# Patient Record
Sex: MALE | Race: WHITE
[De-identification: names, ages, dates, MRNs, and addresses within clinical notes are randomized per-mention and may not be internally consistent; named-entity substitution may affect disease eponyms.]

---

## 2017-05-14 ENCOUNTER — HOSPITAL ENCOUNTER (EMERGENCY)
Dept: HOSPITAL 17 - NEPE | Age: 52
LOS: 1 days | Discharge: HOME | End: 2017-05-15
Payer: SELF-PAY

## 2017-05-14 VITALS
HEART RATE: 99 BPM | DIASTOLIC BLOOD PRESSURE: 74 MMHG | OXYGEN SATURATION: 95 % | TEMPERATURE: 97.7 F | RESPIRATION RATE: 16 BRPM | SYSTOLIC BLOOD PRESSURE: 131 MMHG

## 2017-05-14 VITALS — WEIGHT: 171.96 LBS | HEIGHT: 71 IN | BODY MASS INDEX: 24.07 KG/M2

## 2017-05-14 VITALS
DIASTOLIC BLOOD PRESSURE: 87 MMHG | SYSTOLIC BLOOD PRESSURE: 116 MMHG | OXYGEN SATURATION: 94 % | RESPIRATION RATE: 22 BRPM | HEART RATE: 88 BPM

## 2017-05-14 VITALS
SYSTOLIC BLOOD PRESSURE: 116 MMHG | HEART RATE: 86 BPM | RESPIRATION RATE: 18 BRPM | DIASTOLIC BLOOD PRESSURE: 87 MMHG | OXYGEN SATURATION: 95 %

## 2017-05-14 DIAGNOSIS — F10.129: ICD-10-CM

## 2017-05-14 DIAGNOSIS — R06.02: ICD-10-CM

## 2017-05-14 DIAGNOSIS — Z86.711: ICD-10-CM

## 2017-05-14 DIAGNOSIS — F41.9: ICD-10-CM

## 2017-05-14 DIAGNOSIS — R11.0: ICD-10-CM

## 2017-05-14 DIAGNOSIS — R07.89: Primary | ICD-10-CM

## 2017-05-14 DIAGNOSIS — Z72.0: ICD-10-CM

## 2017-05-14 LAB
ALP SERPL-CCNC: 83 U/L (ref 45–117)
ALT SERPL-CCNC: 22 U/L (ref 12–78)
ANION GAP SERPL CALC-SCNC: 9 MEQ/L (ref 5–15)
APTT BLD: 25 SEC (ref 24.3–30.1)
AST SERPL-CCNC: 36 U/L (ref 15–37)
BASOPHILS # BLD AUTO: 0.1 TH/MM3 (ref 0–0.2)
BASOPHILS NFR BLD: 1.1 % (ref 0–2)
BILIRUB SERPL-MCNC: 0.3 MG/DL (ref 0.2–1)
BUN SERPL-MCNC: 6 MG/DL (ref 7–18)
CHLORIDE SERPL-SCNC: 108 MEQ/L (ref 98–107)
CK SERPL-CCNC: 137 U/L (ref 39–308)
EOSINOPHIL # BLD: 0.2 TH/MM3 (ref 0–0.4)
EOSINOPHIL NFR BLD: 2.8 % (ref 0–4)
ERYTHROCYTE [DISTWIDTH] IN BLOOD BY AUTOMATED COUNT: 14.7 % (ref 11.6–17.2)
GFR SERPLBLD BASED ON 1.73 SQ M-ARVRAT: 72 ML/MIN (ref 89–?)
HCO3 BLD-SCNC: 23.9 MEQ/L (ref 21–32)
HCT VFR BLD CALC: 46.1 % (ref 39–51)
HEMO FLAGS: (no result)
INR PPP: 1 RATIO
LYMPHOCYTES # BLD AUTO: 2.1 TH/MM3 (ref 1–4.8)
LYMPHOCYTES NFR BLD AUTO: 24.4 % (ref 9–44)
MAGNESIUM SERPL-MCNC: 1.8 MG/DL (ref 1.5–2.5)
MCH RBC QN AUTO: 31.7 PG (ref 27–34)
MCHC RBC AUTO-ENTMCNC: 34.6 % (ref 32–36)
MCV RBC AUTO: 91.5 FL (ref 80–100)
MONOCYTES NFR BLD: 7.2 % (ref 0–8)
NEUTROPHILS # BLD AUTO: 5.7 TH/MM3 (ref 1.8–7.7)
NEUTROPHILS NFR BLD AUTO: 64.5 % (ref 16–70)
PLATELET # BLD: 285 TH/MM3 (ref 150–450)
POTASSIUM SERPL-SCNC: 4.2 MEQ/L (ref 3.5–5.1)
PROTHROMBIN TIME: 11.4 SEC (ref 9.8–11.6)
RBC # BLD AUTO: 5.04 MIL/MM3 (ref 4.5–5.9)
SODIUM SERPL-SCNC: 141 MEQ/L (ref 136–145)
WBC # BLD AUTO: 8.8 TH/MM3 (ref 4–11)

## 2017-05-14 PROCEDURE — 85610 PROTHROMBIN TIME: CPT

## 2017-05-14 PROCEDURE — 93005 ELECTROCARDIOGRAM TRACING: CPT

## 2017-05-14 PROCEDURE — 85025 COMPLETE CBC W/AUTO DIFF WBC: CPT

## 2017-05-14 PROCEDURE — 83735 ASSAY OF MAGNESIUM: CPT

## 2017-05-14 PROCEDURE — 71010: CPT

## 2017-05-14 PROCEDURE — 84484 ASSAY OF TROPONIN QUANT: CPT

## 2017-05-14 PROCEDURE — 82550 ASSAY OF CK (CPK): CPT

## 2017-05-14 PROCEDURE — 99285 EMERGENCY DEPT VISIT HI MDM: CPT

## 2017-05-14 PROCEDURE — 85730 THROMBOPLASTIN TIME PARTIAL: CPT

## 2017-05-14 PROCEDURE — 83690 ASSAY OF LIPASE: CPT

## 2017-05-14 PROCEDURE — 71275 CT ANGIOGRAPHY CHEST: CPT

## 2017-05-14 PROCEDURE — 80307 DRUG TEST PRSMV CHEM ANLYZR: CPT

## 2017-05-14 PROCEDURE — 82552 ASSAY OF CPK IN BLOOD: CPT

## 2017-05-14 PROCEDURE — 80053 COMPREHEN METABOLIC PANEL: CPT

## 2017-05-14 PROCEDURE — 96374 THER/PROPH/DIAG INJ IV PUSH: CPT

## 2017-05-14 NOTE — PD
HPI


Chief Complaint:  Chest Pain


Time Seen by Provider:  23:09


Travel History


International Travel<30 days:  No


Contact w/Intl Traveler<30days:  No


Traveled to known affect area:  No





History of Present Illness


HPI


Patient is a 51-year-old male with history of pulmonary embolism no longer 

anticoagulated here with complaint of chest pain.  Patient has had substernal 

pressure, heaviness for the last one hour that radiates to the scapular region.

  Associated shortness of breath, nausea.  Patient denies any emesis or 

diaphoresis.  No history of heart disease.  Patient states that his PE was 

provoked after he fell out of the tree, he works as a .  He has not 

had any pleuritic component to the pain.  No peripheral edema.  Patient smells 

heavily of alcohol and admits to drinking "a couple of beers" tonight.  He is 

quite anxious on exam and admits to feeling this way.





PFSH


Past Medical History


Respiratory:  Yes (pe)


Tetanus Vaccination:  < 5 Years


Influenza Vaccination:  Yes





Past Surgical History


Other Surgery:  Yes (hernia, )





Social History


Alcohol Use:  Yes (occasional )


Tobacco Use:  Yes


Substance Use:  No





Allergies-Medications


(Allergen,Severity, Reaction):  


Coded Allergies:  


     No Known Allergies (Unverified , 5/14/17)


Reported Meds & Prescriptions





Reported Meds & Active Scripts


Active


No Active Prescriptions or Reported Medications    








Review of Systems


ROS Limitations:  Intoxication


Except as stated in HPI:  all other systems reviewed are Neg





Physical Exam


Exam Limitations:  Intoxication


Narrative


GENERAL: Anxious  male in no acute distress, smells heavily of alcohol


SKIN: Focused skin assessment warm/dry.


HEAD: Normocephalic. 


EYES: No scleral icterus. No injection or drainage. 


ENT:   Mucous membranes pink and moist.


NECK: Supple


CARDIOVASCULAR: Regular rate and rhythm.  No murmur appreciated.


RESPIRATORY: No accessory muscle use. Clear to auscultation. Breath sounds 

equal bilaterally. 


GASTROINTESTINAL: Abdomen soft, non-tender, nondistended. 


MUSCULOSKELETAL: No obvious deformities. No edema. 


NEUROLOGICAL: Awake and alert.  Motor grossly within normal limits. Normal 

speech.


PSYCHIATRIC: Anxious





Data


Data


Last Documented VS





Vital Signs








  Date Time  Temp Pulse Resp B/P Pulse Ox O2 Delivery O2 Flow Rate FiO2


 


5/14/17 23:33  80 18  95 Nasal Cannula 2 


 


5/14/17 23:32    116/87    





    125/77    


 


5/14/17 23:06 97.7       








Orders





 Electrocardiogram (5/14/17 23:14)


Ckmb (Isoenzyme) Profile (5/14/17 23:14)


Complete Blood Count With Diff (5/14/17 23:14)


Comprehensive Metabolic Panel (5/14/17 23:14)


Magnesium (Mg) (5/14/17 23:14)


Prothrombin Time / Inr (Pt) (5/14/17 23:14)


Act Partial Throm Time (Ptt) (5/14/17 23:14)


Troponin I (5/14/17 23:14)


Lipase (5/14/17 23:14)


Chest, Single Ap (5/14/17 23:14)


Ecg Monitoring (5/14/17 23:14)


Bilateral Bp Monitoring (5/14/17 23:14)


Iv Access Insert/Monitor (5/14/17 23:14)


Oximetry (5/14/17 23:14)


Aspirin Chew (Aspirin Chew) (5/14/17 23:15)


Sodium Chloride 0.9% Flush (Ns Flush) (5/14/17 23:15)


Ct Pulmonary Angiogram (5/14/17 23:14)


Alcohol (Ethanol) (5/14/17 23:14)


Lorazepam Inj (Ativan Inj) (5/14/17 23:15)


CKMB (5/14/17 23:17)


CKMB% (5/14/17 23:17)


Iohexol 350 Inj (Omnipaque 350 Inj) (5/15/17 00:37)


Electrocardiogram (5/15/17 03:17)


Electrocardiogram (5/15/17 02:17)


Troponin I (5/15/17 02:17)





Labs





 Laboratory Tests








Test 5/14/17 5/15/17





 23:17 02:05


 


White Blood Count 8.8 TH/MM3 


 


Red Blood Count 5.04 MIL/MM3 


 


Hemoglobin 16.0 GM/DL 


 


Hematocrit 46.1 % 


 


Mean Corpuscular Volume 91.5 FL 


 


Mean Corpuscular Hemoglobin 31.7 PG 


 


Mean Corpuscular Hemoglobin 34.6 % 





Concent  


 


Red Cell Distribution Width 14.7 % 


 


Platelet Count 285 TH/MM3 


 


Mean Platelet Volume 8.1 FL 


 


Neutrophils (%) (Auto) 64.5 % 


 


Lymphocytes (%) (Auto) 24.4 % 


 


Monocytes (%) (Auto) 7.2 % 


 


Eosinophils (%) (Auto) 2.8 % 


 


Basophils (%) (Auto) 1.1 % 


 


Neutrophils # (Auto) 5.7 TH/MM3 


 


Lymphocytes # (Auto) 2.1 TH/MM3 


 


Monocytes # (Auto) 0.6 TH/MM3 


 


Eosinophils # (Auto) 0.2 TH/MM3 


 


Basophils # (Auto) 0.1 TH/MM3 


 


CBC Comment DIFF FINAL  


 


Differential Comment   


 


Prothrombin Time 11.4 SEC 


 


Prothromb Time International 1.0 RATIO 





Ratio  


 


Activated Partial 25.0 SEC 





Thromboplast Time  


 


Sodium Level 141 MEQ/L 


 


Potassium Level 4.2 MEQ/L 


 


Chloride Level 108 MEQ/L 


 


Carbon Dioxide Level 23.9 MEQ/L 


 


Anion Gap 9 MEQ/L 


 


Blood Urea Nitrogen 6 MG/DL 


 


Creatinine 1.09 MG/DL 


 


Estimat Glomerular Filtration 72 ML/MIN 





Rate  


 


Random Glucose 105 MG/DL 


 


Calcium Level 8.5 MG/DL 


 


Magnesium Level 1.8 MG/DL 


 


Total Bilirubin 0.3 MG/DL 


 


Aspartate Amino Transf 36 U/L 





(AST/SGOT)  


 


Alanine Aminotransferase 22 U/L 





(ALT/SGPT)  


 


Alkaline Phosphatase 83 U/L 


 


Total Creatine Kinase 137 U/L 


 


Creatine Kinase MB 1.1 NG/ML 


 


Troponin I LESS THAN 0.02 LESS THAN 0.02





 NG/ML NG/ML


 


Total Protein 7.6 GM/DL 


 


Albumin 3.8 GM/DL 


 


Lipase 254 U/L 


 


Ethyl Alcohol Level 131 MG/DL 











MDM


Medical Decision Making


Medical Screen Exam Complete:  Yes


Emergency Medical Condition:  Yes


Medical Record Reviewed:  Yes


Differential Diagnosis


51-year-old male with history of PE no longer anticoagulated here with one hour 

of substernal chest pain radiating to the scapular region, nausea, shortness of 

breath.  Differential includes ACS, PE, atypical chest pain, GERD, pancreatitis

, anxiety, dissection.


Narrative Course


Patient was on monitor, IV established and blood obtained.  A twelve-lead EKG 

shows sinus rhythm without notable ST or T-wave abnormalities and normal 

intervals.  Patient given aspirin, Ativan.  Portable chest x-ray obtained that 

by my read shows no acute abnormalities.  CBC, CMP, lipase, magnesium, CK-MB, 

troponin, coags, blood alcohol level obtained and notable for blood alcohol 

level 131.  CT pulmonary exam negative.  Pain is quite atypical and patient is 

quite anxious.  Given his intoxication which limits history repeat 3 hour 

cardiac enzyme and EKG were obtained.  Troponin remains undetectable and EKG 

remains unchanged, sinus rhythm.  Patient was reassured and discharged home.





Diagnosis





 Primary Impression:  


 Atypical chest pain


 Additional Impressions:  


 Alcohol intoxication


 Qualified Code:  F10.920 - Alcohol intoxication, uncomplicated


 Anxiety


Referrals:  


[facility]


call for appointment





***Additional Instructions:


Follow-up to establish care with primary care physician as discussed.


***Med/Other Pt SpecificInfo:  No Change to Meds


Scripts


No Active Prescriptions or Reported Meds


Disposition:  01 DISCHARGE HOME


Condition:  Stable








Deedee Ellis MD May 14, 2017 23:25

## 2017-05-14 NOTE — RADRPT
EXAM DATE/TIME:  05/14/2017 23:24 

 

[facility] COMPARISON:     

No previous studies available for comparison.

 

                     

INDICATIONS :     

Chest pain.

                     

 

MEDICAL HISTORY :     

None.          

 

SURGICAL HISTORY :     

None.   

 

ENCOUNTER:     

Initial                                        

 

ACUITY:     

1 day      

 

PAIN SCORE:     

4/10

 

LOCATION:     

Bilateral chest 

 

FINDINGS:     

A single view of the chest demonstrates the lungs to be symmetrically aerated without evidence of mas
s, infiltrate or effusion.  The cardiomediastinal contours are unremarkable.  Osseous structures are 
intact. There are multiple overlying electrocardiogram leads.

 

CONCLUSION:     No acute disease.  

 

 

 

 Luke Lara MD on May 14, 2017 at 23:30           

Board Certified Radiologist.

 This report was verified electronically.

## 2017-05-15 LAB — CK MB SERPL-MCNC: 1.1 NG/ML (ref 0.5–3.6)

## 2017-05-15 NOTE — EKG
Date Performed: 05/14/2017       Time Performed: 23:11:40

 

PTAGE:      51 years

 

EKG:      Sinus rhythm 

 

 NORMAL ECG 

 

NO PREVIOUS TRACING            

 

DOCTOR:   Marc See  Interpretating Date/Time  05/15/2017 14:38:54

## 2017-05-15 NOTE — RADRPT
EXAM DATE/TIME:  05/15/2017 00:35 

 

[facility] COMPARISON:     

CHEST SINGLE AP, May 14, 2017, 23:24.

 

 

INDICATIONS :     

Medial chest pain.

                      

 

IV CONTRAST:     

79 cc Omnipaque 350 (iohexol) IV 

 

 

RADIATION DOSE:     

23.17 CTDIvol (mGy) 

 

 

MEDICAL HISTORY :       

Pulmonary embolism

 

SURGICAL HISTORY :       

Hernia repair.

 

ENCOUNTER:      

Initial

 

ACUITY:      

1 day

 

PAIN SCALE:      

6/10

 

LOCATION:        

chest 

 

TECHNIQUE:     

Volumetric scanning of the chest was performed using a pulmonary embolism protocol MIP images were re
constructed.  Using automated exposure control and adjustment of the mA and/or kV according to patien
t size, radiation dose was kept as low as reasonably achievable to obtain optimal diagnostic quality 
images. 

 

FINDINGS:     

 

PULMONARY ARTERIES:

No filling defects are seen in the pulmonary arteries through the segmental level.

 

LUNGS:     

There is no consolidation or pneumothorax .  No concerning pulmonary nodule is visualized.

 

PLEURAE:     

There is no pleural thickening or pleural effusion.

 

MEDIASTINUM:     

There is good visualization of the great vessels of the middle mediastinum.  No evidence of mediastin
al or hilar adenopathy/mass.

 

MUSCULOSKELETAL:     

Within normal limits for patient age.

 

MISCELLANEOUS:     

The visualized upper abdominal organs demonstrate no acute abnormality.

 

CONCLUSION:     

1. No evidence of pulmonary embolism.

2. The lungs are clear.

 

 

 

 Luke Lara MD on May 15, 2017 at 0:51           

Board Certified Radiologist.

 This report was verified electronically.

## 2017-05-15 NOTE — EKG
Date Performed: 05/15/2017       Time Performed: 02:32:19

 

PTAGE:      51 years

 

EKG:      Sinus rhythm 

 

 NORMAL ECG

 

PREVIOUS TRACING       : 05/14/2017 11.11.40 Since previous tracing, no significant change noted

 

DOCTOR:   Marc See  Interpretating Date/Time  05/15/2017 14:39:24

## 2017-08-15 ENCOUNTER — HOSPITAL ENCOUNTER (EMERGENCY)
Dept: HOSPITAL 17 - NEPD | Age: 52
Discharge: HOME | End: 2017-08-15
Payer: SELF-PAY

## 2017-08-15 VITALS — HEIGHT: 71 IN | WEIGHT: 176.37 LBS | BODY MASS INDEX: 24.69 KG/M2

## 2017-08-15 VITALS
HEART RATE: 79 BPM | SYSTOLIC BLOOD PRESSURE: 152 MMHG | RESPIRATION RATE: 18 BRPM | DIASTOLIC BLOOD PRESSURE: 91 MMHG | OXYGEN SATURATION: 96 %

## 2017-08-15 VITALS
SYSTOLIC BLOOD PRESSURE: 161 MMHG | HEART RATE: 95 BPM | RESPIRATION RATE: 20 BRPM | DIASTOLIC BLOOD PRESSURE: 128 MMHG | OXYGEN SATURATION: 97 % | TEMPERATURE: 97.4 F

## 2017-08-15 DIAGNOSIS — F17.290: ICD-10-CM

## 2017-08-15 DIAGNOSIS — F10.230: Primary | ICD-10-CM

## 2017-08-15 LAB
ALP SERPL-CCNC: 108 U/L (ref 45–117)
ALT SERPL-CCNC: 61 U/L (ref 12–78)
ANION GAP SERPL CALC-SCNC: 4 MEQ/L (ref 5–15)
AST SERPL-CCNC: 85 U/L (ref 15–37)
BASOPHILS # BLD AUTO: 0 TH/MM3 (ref 0–0.2)
BASOPHILS NFR BLD: 0.4 % (ref 0–2)
BILIRUB SERPL-MCNC: 0.6 MG/DL (ref 0.2–1)
BUN SERPL-MCNC: 10 MG/DL (ref 7–18)
CHLORIDE SERPL-SCNC: 103 MEQ/L (ref 98–107)
EOSINOPHIL # BLD: 0.1 TH/MM3 (ref 0–0.4)
EOSINOPHIL NFR BLD: 1.2 % (ref 0–4)
ERYTHROCYTE [DISTWIDTH] IN BLOOD BY AUTOMATED COUNT: 14 % (ref 11.6–17.2)
ETHANOL SERPL-MCNC: 287 MG/DL (ref 0–5)
GFR SERPLBLD BASED ON 1.73 SQ M-ARVRAT: 95 ML/MIN (ref 89–?)
HCO3 BLD-SCNC: 29.1 MEQ/L (ref 21–32)
HCT VFR BLD CALC: 44.5 % (ref 39–51)
HEMO FLAGS: (no result)
LYMPHOCYTES # BLD AUTO: 1.4 TH/MM3 (ref 1–4.8)
LYMPHOCYTES NFR BLD AUTO: 23 % (ref 9–44)
MAGNESIUM SERPL-MCNC: 2.6 MG/DL (ref 1.5–2.5)
MCH RBC QN AUTO: 32 PG (ref 27–34)
MCHC RBC AUTO-ENTMCNC: 35 % (ref 32–36)
MCV RBC AUTO: 91.2 FL (ref 80–100)
MONOCYTES NFR BLD: 9.2 % (ref 0–8)
NEUTROPHILS # BLD AUTO: 4 TH/MM3 (ref 1.8–7.7)
NEUTROPHILS NFR BLD AUTO: 66.2 % (ref 16–70)
PLATELET # BLD: 166 TH/MM3 (ref 150–450)
POTASSIUM SERPL-SCNC: 4.4 MEQ/L (ref 3.5–5.1)
RBC # BLD AUTO: 4.88 MIL/MM3 (ref 4.5–5.9)
SODIUM SERPL-SCNC: 136 MEQ/L (ref 136–145)
WBC # BLD AUTO: 6.1 TH/MM3 (ref 4–11)

## 2017-08-15 PROCEDURE — 99284 EMERGENCY DEPT VISIT MOD MDM: CPT

## 2017-08-15 PROCEDURE — 96374 THER/PROPH/DIAG INJ IV PUSH: CPT

## 2017-08-15 PROCEDURE — 85025 COMPLETE CBC W/AUTO DIFF WBC: CPT

## 2017-08-15 PROCEDURE — 96361 HYDRATE IV INFUSION ADD-ON: CPT

## 2017-08-15 PROCEDURE — 80053 COMPREHEN METABOLIC PANEL: CPT

## 2017-08-15 PROCEDURE — 80307 DRUG TEST PRSMV CHEM ANLYZR: CPT

## 2017-08-15 PROCEDURE — 96375 TX/PRO/DX INJ NEW DRUG ADDON: CPT

## 2017-08-15 PROCEDURE — 83735 ASSAY OF MAGNESIUM: CPT

## 2017-08-15 NOTE — PD
Data


Data


Last Documented VS





Vital Signs








  Date Time  Temp Pulse Resp B/P Pulse Ox O2 Delivery O2 Flow Rate FiO2


 


8/15/17 16:42  79 18 152/91 96 Room Air  


 


8/15/17 13:55 97.4       








Orders





 Complete Blood Count With Diff (8/15/17 16:51)


Comprehensive Metabolic Panel (8/15/17 16:51)


Magnesium (Mg) (8/15/17 16:51)


Alcohol (Ethanol) (8/15/17 16:51)


Chlordiazepoxide (Librium) (8/15/17 17:00)


Sodium Chlor 0.9% 1000 Ml Inj (Ns 1000 M (8/15/17 17:00)


Lorazepam Inj (Ativan Inj) (8/15/17 18:30)


Sodium Chlor 0.9% 1000 Ml Inj (Ns 1000 M (8/15/17 18:30)


Thiamine Inj (Thiamine Inj) (8/15/17 18:45)





Labs





 Laboratory Tests








Test 8/15/17





 17:00


 


White Blood Count 6.1 TH/MM3


 


Red Blood Count 4.88 MIL/MM3


 


Hemoglobin 15.6 GM/DL


 


Hematocrit 44.5 %


 


Mean Corpuscular Volume 91.2 FL


 


Mean Corpuscular Hemoglobin 32.0 PG


 


Mean Corpuscular Hemoglobin 35.0 %





Concent 


 


Red Cell Distribution Width 14.0 %


 


Platelet Count 166 TH/MM3


 


Mean Platelet Volume 7.6 FL


 


Neutrophils (%) (Auto) 66.2 %


 


Lymphocytes (%) (Auto) 23.0 %


 


Monocytes (%) (Auto) 9.2 %


 


Eosinophils (%) (Auto) 1.2 %


 


Basophils (%) (Auto) 0.4 %


 


Neutrophils # (Auto) 4.0 TH/MM3


 


Lymphocytes # (Auto) 1.4 TH/MM3


 


Monocytes # (Auto) 0.6 TH/MM3


 


Eosinophils # (Auto) 0.1 TH/MM3


 


Basophils # (Auto) 0.0 TH/MM3


 


CBC Comment DIFF FINAL 


 


Differential Comment  


 


Sodium Level 136 MEQ/L


 


Potassium Level 4.4 MEQ/L


 


Chloride Level 103 MEQ/L


 


Carbon Dioxide Level 29.1 MEQ/L


 


Anion Gap 4 MEQ/L


 


Blood Urea Nitrogen 10 MG/DL


 


Creatinine 0.85 MG/DL


 


Estimat Glomerular Filtration 95 ML/MIN





Rate 


 


Random Glucose 90 MG/DL


 


Calcium Level 8.1 MG/DL


 


Magnesium Level 2.6 MG/DL


 


Total Bilirubin 0.6 MG/DL


 


Aspartate Amino Transf 85 U/L





(AST/SGOT) 


 


Alanine Aminotransferase 61 U/L





(ALT/SGPT) 


 


Alkaline Phosphatase 108 U/L


 


Total Protein 7.8 GM/DL


 


Albumin 4.1 GM/DL


 


Ethyl Alcohol Level 287 MG/DL











[facility]


Medical Record Reviewed:  Yes


Supervised Visit with CALEB:  No


Narrative Course


CBC & BMP Diagram


8/15/17 17:00








Mg 2.6


AST 85


EtOH 287





Vital signs have been normal.  The patient has been resting comfortably in his 

room watching TV.  Patient will go home with Librium.  He was agreeable with 

plan.  Please refer to the outgoing provider's documentation.


Diagnosis





 Primary Impression:  


 Alcohol abuse


 Additional Impression:  


 Alcohol withdrawal


 Qualified Code:  F10.230 - Alcohol withdrawal syndrome without complication


Referrals:  


[facility]


2 days





***Additional Instruction:


You have a choice when it comes to health care, and we are glad that you chose 

[facility]. Hopefully, we have met your expectations on today's visit.  You 

are welcome to return to [facility] at any time, as we are committed to 

meeting the health care needs of our community.


***Med/Other Pt SpecificInfo:  Prescription(s) given


Scripts


Chlordiazepoxide HCl 25 Mg Capsule1 Tab PO AS DIRECTED  #20 


   Prov:Quinton Katz MD         8/15/17


Disposition:  01 DISCHARGE HOME


Condition:  Zelalem Carbajal MD Aug 15, 2017 20:12

## 2017-08-15 NOTE — PD
HPI


Chief Complaint:  Medical Clearance


Time Seen by Provider:  16:34


Travel History


International Travel<30 days:  No


Contact w/Intl Traveler<30days:  No


Traveled to known affect area:  No





History of Present Illness


HPI


The patient is a 52-year-old  male who presents to the emergency 

department for alcohol detoxification.  The patient states he has been drinking 

a case a day for the last 2 weeks, has a history of previous alcohol withdrawal 

as well as to previous attempts at alcohol detoxification.  The patient is 

requesting treatment to stop drinking alcohol.  The patient's last drink 

alcohol was earlier today.  He currently complains of the shakes and feels like 

he is going to "pass out ".  He denies any chest pain, shortness breath, nausea

, vomiting, or abdominal pain.  He does complain of mild tremors and feeling 

ill.  He does not currently have a primary physician.  He denies any illicit 

drug use.





PFSH


Past Medical History


Medical History:  Denies Significant Hx


Cardiovascular Problems:  Yes


Influenza Vaccination:  No





Past Surgical History


Surgical History:  No Previous Surgery





Social History


Alcohol Use:  Yes (over a case of beer a day)


Tobacco Use:  Yes


Substance Use:  No





Allergies-Medications


(Allergen,Severity, Reaction):  


Coded Allergies:  


     No Known Drug Allergies (Verified  Allergy, Unknown, 8/15/17)





Review of Systems


Except as stated in HPI:  all other systems reviewed are Neg


General / Constitutional:  No: Fever


HENT:  Positive: Lightheadedness


Cardiovascular:  No: Chest Pain or Discomfort


Respiratory:  No: Shortness of Breath


Gastrointestinal:  No: Nausea, Vomiting, Abdominal Pain


Musculoskeletal:  Positive: Weakness


Neurologic:  Positive: Other (tremors)


Psychiatric:  Positive: Substance Abuse (alcohol abuse)





Physical Exam


Narrative


GENERAL: Awake, alert, pleasant 52-year-old male who appears his stated age and 

is in no acute respiratory distress.


SKIN: Focused skin assessment warm, slightly diaphoretic.


HEAD: Atraumatic. Normocephalic. 


EYES: Pupils equal and round. No scleral icterus. No injection or drainage. 


ENT: No nasal bleeding or discharge.  Dry mucous membranes.


NECK: Trachea midline. No JVD. 


CARDIOVASCULAR: Regular rate and rhythm.  No murmur appreciated.  Heart rate in 

the 90s.


RESPIRATORY: No accessory muscle use. Clear to auscultation. Breath sounds 

equal bilaterally. 


GASTROINTESTINAL: Abdomen soft, non-tender, nondistended.  No rebound 

tenderness.


MUSCULOSKELETAL: No obvious deformities. No clubbing.  No cyanosis.  No edema.  

Mildly tremulous in the hands.


NEUROLOGICAL: Awake and alert. No obvious cranial nerve deficits.  Motor 

grossly within normal limits. Normal speech.


PSYCHIATRIC: Appropriate mood and affect; insight and judgment normal.





Data


Data


Last Documented VS





Vital Signs








  Date Time  Temp Pulse Resp B/P Pulse Ox O2 Delivery O2 Flow Rate FiO2


 


8/15/17 16:42  79 18 152/91 96 Room Air  


 


8/15/17 13:55 97.4       








Orders





 Complete Blood Count With Diff (8/15/17 16:51)


Comprehensive Metabolic Panel (8/15/17 16:51)


Magnesium (Mg) (8/15/17 16:51)


Alcohol (Ethanol) (8/15/17 16:51)


Chlordiazepoxide (Librium) (8/15/17 17:00)


Sodium Chlor 0.9% 1000 Ml Inj (Ns 1000 M (8/15/17 17:00)








MDM


Medical Decision Making


Medical Screen Exam Complete:  Yes


Emergency Medical Condition:  Yes


Medical Record Reviewed:  Yes


Differential Diagnosis


Differential diagnosis includes alcohol intoxication, alcohol withdrawal, 

dehydration, electrolyte abnormality, alcohol abuse.


Narrative Course


IV was established, labs are drawn and sent, and the patient was placed on 

cardiac telemetry monitoring and continuous pulse oximetry monitoring.  The 

patient was administered Librium 50 mg orally and 1 L of normal saline.  

Potassium and magnesium were sent to lab.  The patient was signed out to the 

oncoming physician, Dr. Lyons, at 5 PM.





Diagnosis





 Primary Impression:  


 Alcohol abuse


 Additional Impression:  


 Alcohol withdrawal


 Qualified Code:  F10.230 - Alcohol withdrawal syndrome without complication


Scripts


Chlordiazepoxide HCl 25 Mg Capsule1 Tab PO AS DIRECTED  #20 


   Prov:Quinton Katz MD         8/15/17


Disposition:  01 DISCHARGE HOME


Condition:  Stable








Quinton Katz MD Aug 15, 2017 17:09

## 2017-08-15 NOTE — PD
Physical Exam


Date Seen by Provider:  Aug 15, 2017


Time Seen by Provider:  13:59


Narrative


52 YOWM C/O DETOX FROM ALCOHOL.





VS REVIEWED


AWAITING BED PLACEMENT





Data


Data


Last Documented VS





Vital Signs








  Date Time  Temp Pulse Resp B/P Pulse Ox O2 Delivery O2 Flow Rate FiO2


 


8/15/17 13:55 97.4 95 20 161/128 97 Room Air  











MDM


Supervised Visit with CALEB:  No


Condition:  Stable








Luis Ricardo Aug 15, 2017 14:02

## 2017-08-29 ENCOUNTER — HOSPITAL ENCOUNTER (OUTPATIENT)
Dept: HOSPITAL 17 - NEDAMB | Age: 52
Setting detail: OBSERVATION
LOS: 6 days | Discharge: HOME | End: 2017-09-04
Attending: HOSPITALIST | Admitting: HOSPITALIST
Payer: SELF-PAY

## 2017-08-29 VITALS
SYSTOLIC BLOOD PRESSURE: 124 MMHG | OXYGEN SATURATION: 94 % | HEART RATE: 102 BPM | RESPIRATION RATE: 18 BRPM | DIASTOLIC BLOOD PRESSURE: 84 MMHG

## 2017-08-29 VITALS
OXYGEN SATURATION: 94 % | RESPIRATION RATE: 16 BRPM | HEART RATE: 97 BPM | DIASTOLIC BLOOD PRESSURE: 95 MMHG | SYSTOLIC BLOOD PRESSURE: 153 MMHG

## 2017-08-29 VITALS
DIASTOLIC BLOOD PRESSURE: 75 MMHG | RESPIRATION RATE: 16 BRPM | SYSTOLIC BLOOD PRESSURE: 125 MMHG | HEART RATE: 102 BPM | OXYGEN SATURATION: 92 %

## 2017-08-29 VITALS
SYSTOLIC BLOOD PRESSURE: 135 MMHG | TEMPERATURE: 98.7 F | DIASTOLIC BLOOD PRESSURE: 90 MMHG | OXYGEN SATURATION: 96 % | RESPIRATION RATE: 16 BRPM | HEART RATE: 106 BPM

## 2017-08-29 VITALS
DIASTOLIC BLOOD PRESSURE: 82 MMHG | TEMPERATURE: 98.4 F | HEART RATE: 104 BPM | SYSTOLIC BLOOD PRESSURE: 135 MMHG | RESPIRATION RATE: 21 BRPM | OXYGEN SATURATION: 92 %

## 2017-08-29 VITALS
HEART RATE: 106 BPM | DIASTOLIC BLOOD PRESSURE: 76 MMHG | OXYGEN SATURATION: 97 % | SYSTOLIC BLOOD PRESSURE: 146 MMHG | RESPIRATION RATE: 18 BRPM

## 2017-08-29 VITALS — WEIGHT: 171.74 LBS | BODY MASS INDEX: 24.04 KG/M2 | HEIGHT: 71 IN

## 2017-08-29 VITALS
RESPIRATION RATE: 16 BRPM | DIASTOLIC BLOOD PRESSURE: 81 MMHG | HEART RATE: 70 BPM | OXYGEN SATURATION: 96 % | SYSTOLIC BLOOD PRESSURE: 139 MMHG

## 2017-08-29 DIAGNOSIS — F10.231: Primary | ICD-10-CM

## 2017-08-29 DIAGNOSIS — Z86.711: ICD-10-CM

## 2017-08-29 DIAGNOSIS — F17.210: ICD-10-CM

## 2017-08-29 DIAGNOSIS — F43.23: ICD-10-CM

## 2017-08-29 DIAGNOSIS — F43.12: ICD-10-CM
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
## 2017-08-29 DIAGNOSIS — J44.9: ICD-10-CM

## 2017-08-29 DIAGNOSIS — E87.6: ICD-10-CM

## 2017-08-29 DIAGNOSIS — R07.89: ICD-10-CM

## 2017-08-29 LAB
ANION GAP SERPL CALC-SCNC: 17 MEQ/L (ref 5–15)
APAP SERPL-MCNC: (no result) MCG/ML (ref 10–30)
BASOPHILS # BLD AUTO: 0 TH/MM3 (ref 0–0.2)
BASOPHILS NFR BLD: 0.2 % (ref 0–2)
BUN SERPL-MCNC: 10 MG/DL (ref 7–18)
CHLORIDE SERPL-SCNC: 94 MEQ/L (ref 98–107)
EOSINOPHIL # BLD: 0 TH/MM3 (ref 0–0.4)
EOSINOPHIL NFR BLD: 0.1 % (ref 0–4)
ERYTHROCYTE [DISTWIDTH] IN BLOOD BY AUTOMATED COUNT: 15 % (ref 11.6–17.2)
ETHANOL SERPL-MCNC: 442 MG/DL (ref 0–5)
GFR SERPLBLD BASED ON 1.73 SQ M-ARVRAT: 74 ML/MIN (ref 89–?)
HCO3 BLD-SCNC: 22.5 MEQ/L (ref 21–32)
HCT VFR BLD CALC: 46.5 % (ref 39–51)
HEMO FLAGS: (no result)
LYMPHOCYTES # BLD AUTO: 0.5 TH/MM3 (ref 1–4.8)
LYMPHOCYTES NFR BLD AUTO: 6.2 % (ref 9–44)
MCH RBC QN AUTO: 31.1 PG (ref 27–34)
MCHC RBC AUTO-ENTMCNC: 33.7 % (ref 32–36)
MCV RBC AUTO: 92.4 FL (ref 80–100)
MONOCYTES NFR BLD: 9.8 % (ref 0–8)
NEUTROPHILS # BLD AUTO: 7.1 TH/MM3 (ref 1.8–7.7)
NEUTROPHILS NFR BLD AUTO: 83.7 % (ref 16–70)
PLATELET # BLD: 228 TH/MM3 (ref 150–450)
POTASSIUM SERPL-SCNC: 2.9 MEQ/L (ref 3.5–5.1)
RBC # BLD AUTO: 5.03 MIL/MM3 (ref 4.5–5.9)
SODIUM SERPL-SCNC: 133 MEQ/L (ref 136–145)
WBC # BLD AUTO: 8.4 TH/MM3 (ref 4–11)

## 2017-08-29 PROCEDURE — 93306 TTE W/DOPPLER COMPLETE: CPT

## 2017-08-29 PROCEDURE — 85025 COMPLETE CBC W/AUTO DIFF WBC: CPT

## 2017-08-29 PROCEDURE — 80076 HEPATIC FUNCTION PANEL: CPT

## 2017-08-29 PROCEDURE — G0378 HOSPITAL OBSERVATION PER HR: HCPCS

## 2017-08-29 PROCEDURE — 97162 PT EVAL MOD COMPLEX 30 MIN: CPT

## 2017-08-29 PROCEDURE — 76937 US GUIDE VASCULAR ACCESS: CPT

## 2017-08-29 PROCEDURE — 97116 GAIT TRAINING THERAPY: CPT

## 2017-08-29 PROCEDURE — 80053 COMPREHEN METABOLIC PANEL: CPT

## 2017-08-29 PROCEDURE — 84484 ASSAY OF TROPONIN QUANT: CPT

## 2017-08-29 PROCEDURE — 96374 THER/PROPH/DIAG INJ IV PUSH: CPT

## 2017-08-29 PROCEDURE — 80061 LIPID PANEL: CPT

## 2017-08-29 PROCEDURE — 71010: CPT

## 2017-08-29 PROCEDURE — 96361 HYDRATE IV INFUSION ADD-ON: CPT

## 2017-08-29 PROCEDURE — 80307 DRUG TEST PRSMV CHEM ANLYZR: CPT

## 2017-08-29 PROCEDURE — 83735 ASSAY OF MAGNESIUM: CPT

## 2017-08-29 PROCEDURE — 96372 THER/PROPH/DIAG INJ SC/IM: CPT

## 2017-08-29 PROCEDURE — 71275 CT ANGIOGRAPHY CHEST: CPT

## 2017-08-29 PROCEDURE — 97110 THERAPEUTIC EXERCISES: CPT

## 2017-08-29 PROCEDURE — 96376 TX/PRO/DX INJ SAME DRUG ADON: CPT

## 2017-08-29 PROCEDURE — 82552 ASSAY OF CPK IN BLOOD: CPT

## 2017-08-29 PROCEDURE — 84100 ASSAY OF PHOSPHORUS: CPT

## 2017-08-29 PROCEDURE — 96375 TX/PRO/DX INJ NEW DRUG ADDON: CPT

## 2017-08-29 PROCEDURE — 80048 BASIC METABOLIC PNL TOTAL CA: CPT

## 2017-08-29 PROCEDURE — 93005 ELECTROCARDIOGRAM TRACING: CPT

## 2017-08-29 PROCEDURE — 82550 ASSAY OF CK (CPK): CPT

## 2017-08-29 PROCEDURE — 84443 ASSAY THYROID STIM HORMONE: CPT

## 2017-08-29 PROCEDURE — 99285 EMERGENCY DEPT VISIT HI MDM: CPT

## 2017-08-29 NOTE — PD
HPI


Chief Complaint:  Alcohol/Drug Intoxication


Time Seen by Provider:  12:50


Travel History


International Travel<30 days:  No


Contact w/Intl Traveler<30days:  No


Traveled to known affect area:  No





History of Present Illness


HPI


52-year-old male presents to the emergency department under a Feliciano act for 

evaluation.  Patient states he has consumed a large amount of alcohol today.  

He is having difficult time dealing with his brother's death. This was not a 

suicide attempt.  Patient has documented history of alcohol abuse.  States that 

he does not typically drink daily.  He has no other symptoms to report.





[facility]


Past Medical History


Medical History:  Denies Significant Hx


Respiratory:  Yes (pe)





Past Surgical History


Other Surgery:  Yes (hernia, )





Social History


Alcohol Use:  Yes (occasional )


Tobacco Use:  Yes


Substance Use:  No





Allergies-Medications


(Allergen,Severity, Reaction):  


Coded Allergies:  


     No Known Allergies (Unverified , 8/29/17)


Reported Meds & Prescriptions





Reported Meds & Active Scripts


Active


No Active Prescriptions or Reported Medications    








Review of Systems


ROS Limitations:  Intoxication


Except as stated in HPI:  all other systems reviewed are Neg





Physical Exam


Exam Limitations:  Intoxication


Narrative


GENERAL: Well-nourished but unkempt male patient, clinically intoxicated with 

strong smell of alcohol and urine, in no acute distress.


SKIN: Focused skin assessment warm/dry.


HEAD: Atraumatic. Normocephalic. 


EYES: Pupils equal and round. No scleral icterus. No injection or drainage. 


ENT: No nasal bleeding or discharge.  Mucous membranes pink and moist.


NECK: Trachea midline. No JVD. 


CARDIOVASCULAR: Regular rate and rhythm.  No murmur appreciated.


RESPIRATORY: No accessory muscle use.  Diminished to auscultation. Breath 

sounds equal bilaterally. 


GASTROINTESTINAL: Abdomen soft, non-tender, nondistended. Hepatic and splenic 

margins not palpable. 


MUSCULOSKELETAL: No obvious deformities. No clubbing.  No cyanosis.  No edema. 


NEUROLOGICAL: Arousable.  I am unable to thoroughly assess cranial nerves.  

Patient moves all extremities.  He has slurred speech.





Data


Data


Last Documented VS





Vital Signs








  Date Time  Temp Pulse Resp B/P (MAP) Pulse Ox O2 Delivery O2 Flow Rate FiO2


 


8/29/17 16:30  106 18 146/76 (99) 97 Nasal Cannula 2.00 


 


8/29/17 13:50 98.4       








Orders





 Orders


Complete Blood Count With Diff (8/29/17 11:09)


Basic Metabolic Panel (Bmp) (8/29/17 11:09)


Alcohol (Ethanol) (8/29/17 11:09)


Salicylates (Aspirin) (8/29/17 11:09)


Tylenol (Acetaminophen) (8/29/17 11:09)


Magnesium (Mg) (8/29/17 12:46)


Potassium Chloride (Kcl) (8/29/17 13:00)


Alcohol Withdrawal Asmt-Ciwa ONCE (8/29/17 14:06)


Flumazenil Inj (Romazicon Inj) (8/29/17 14:15)


Lorazepam (Ativan) (8/29/17 14:15)


Lorazepam Inj (Ativan Inj) (8/29/17 14:15)


Lorazepam (Ativan) (8/29/17 14:15)


Lorazepam Inj (Ativan Inj) (8/29/17 14:15)





Labs





Laboratory Tests








Test


  8/29/17


11:10


 


White Blood Count 8.4 TH/MM3 


 


Red Blood Count 5.03 MIL/MM3 


 


Hemoglobin 15.7 GM/DL 


 


Hematocrit 46.5 % 


 


Mean Corpuscular Volume 92.4 FL 


 


Mean Corpuscular Hemoglobin 31.1 PG 


 


Mean Corpuscular Hemoglobin


Concent 33.7 % 


 


 


Red Cell Distribution Width 15.0 % 


 


Platelet Count 228 TH/MM3 


 


Mean Platelet Volume 6.9 FL 


 


Neutrophils (%) (Auto) 83.7 % 


 


Lymphocytes (%) (Auto) 6.2 % 


 


Monocytes (%) (Auto) 9.8 % 


 


Eosinophils (%) (Auto) 0.1 % 


 


Basophils (%) (Auto) 0.2 % 


 


Neutrophils # (Auto) 7.1 TH/MM3 


 


Lymphocytes # (Auto) 0.5 TH/MM3 


 


Monocytes # (Auto) 0.8 TH/MM3 


 


Eosinophils # (Auto) 0.0 TH/MM3 


 


Basophils # (Auto) 0.0 TH/MM3 


 


CBC Comment DIFF FINAL 


 


Differential Comment  


 


Blood Urea Nitrogen 10 MG/DL 


 


Creatinine 1.06 MG/DL 


 


Random Glucose 150 MG/DL 


 


Calcium Level 8.3 MG/DL 


 


Sodium Level 133 MEQ/L 


 


Potassium Level 2.9 MEQ/L 


 


Chloride Level 94 MEQ/L 


 


Carbon Dioxide Level 22.5 MEQ/L 


 


Anion Gap 17 MEQ/L 


 


Estimat Glomerular Filtration


Rate 74 ML/MIN 


 


 


Magnesium Level 2.1 MG/DL 


 


Salicylates Level


  LESS THAN 1.7


MG/DL


 


Acetaminophen Level


  LESS THAN 2.0


MCG/ML


 


Ethyl Alcohol Level 442 MG/DL 











[facility]


Medical Decision Making


Medical Screen Exam Complete:  Yes


Emergency Medical Condition:  Yes


Medical Record Reviewed:  Yes


Differential Diagnosis


Intoxication versus polysubstance abuse versus mood disorder versus personality 

disorder versus adjustment reaction disorder


Narrative Course


52-year-old male presents to the emergency department under a Feliciano act for 

evaluation.  Patient is clinically intoxicated.  He has slurred speech with 

strong smell of alcohol.  He has also avoided on himself.


CBC is without acute concern.  BMP is with hypokalemia at 2.9.  This is 

repleted here in the emergency department.  EtOH is 442.  Patient will be 

monitored until he is clinically sober and has a safe mode of transportation at 

which time he'll be discharged home.





Diagnosis





 Primary Impression:  


 Alcohol intoxication


 Qualified Codes:  F10.929 - Alcohol use, unspecified with intoxication, 

unspecified


 Additional Impression:  


 Hypokalemia


Referrals:  


Primary Care Physician


Patient Instructions:  Abuse of Alcohol (ED), General Instructions, Hypokalemia 

(ED)





***Additional Instructions:  


Consume alcohol in moderation


Follow-up with a primary care provider


Return immediately with any acute worsening of symptoms


***Med/Other Pt SpecificInfo:  No Change to Meds


Scripts


No Active Prescriptions or Reported Meds


Disposition:  01 DISCHARGE HOME


Condition:  Stable











PhillipsDeana carbajal FREDRICK Aug 29, 2017 12:50

## 2017-08-30 VITALS
SYSTOLIC BLOOD PRESSURE: 133 MMHG | DIASTOLIC BLOOD PRESSURE: 85 MMHG | RESPIRATION RATE: 20 BRPM | HEART RATE: 85 BPM | OXYGEN SATURATION: 95 % | TEMPERATURE: 98.8 F

## 2017-08-30 VITALS — HEART RATE: 73 BPM

## 2017-08-30 VITALS
DIASTOLIC BLOOD PRESSURE: 69 MMHG | OXYGEN SATURATION: 95 % | SYSTOLIC BLOOD PRESSURE: 136 MMHG | RESPIRATION RATE: 16 BRPM | HEART RATE: 80 BPM | TEMPERATURE: 98.2 F

## 2017-08-30 VITALS
OXYGEN SATURATION: 94 % | SYSTOLIC BLOOD PRESSURE: 131 MMHG | DIASTOLIC BLOOD PRESSURE: 78 MMHG | RESPIRATION RATE: 19 BRPM | TEMPERATURE: 98.1 F | HEART RATE: 92 BPM

## 2017-08-30 VITALS
RESPIRATION RATE: 19 BRPM | OXYGEN SATURATION: 96 % | DIASTOLIC BLOOD PRESSURE: 68 MMHG | SYSTOLIC BLOOD PRESSURE: 130 MMHG | TEMPERATURE: 98.6 F | HEART RATE: 86 BPM

## 2017-08-30 VITALS
OXYGEN SATURATION: 96 % | SYSTOLIC BLOOD PRESSURE: 146 MMHG | HEART RATE: 85 BPM | RESPIRATION RATE: 18 BRPM | DIASTOLIC BLOOD PRESSURE: 69 MMHG | TEMPERATURE: 98.3 F

## 2017-08-30 VITALS
DIASTOLIC BLOOD PRESSURE: 88 MMHG | RESPIRATION RATE: 20 BRPM | SYSTOLIC BLOOD PRESSURE: 146 MMHG | OXYGEN SATURATION: 93 % | HEART RATE: 93 BPM

## 2017-08-30 VITALS
OXYGEN SATURATION: 95 % | SYSTOLIC BLOOD PRESSURE: 119 MMHG | RESPIRATION RATE: 16 BRPM | HEART RATE: 94 BPM | DIASTOLIC BLOOD PRESSURE: 89 MMHG

## 2017-08-30 LAB
ALP SERPL-CCNC: 189 U/L (ref 45–117)
ALT SERPL-CCNC: 97 U/L (ref 12–78)
ANION GAP SERPL CALC-SCNC: 7 MEQ/L (ref 5–15)
AST SERPL-CCNC: 136 U/L (ref 15–37)
BILIRUB SERPL-MCNC: 0.9 MG/DL (ref 0.2–1)
BUN SERPL-MCNC: 14 MG/DL (ref 7–18)
CHLORIDE SERPL-SCNC: 98 MEQ/L (ref 98–107)
CK MB SERPL-MCNC: 1.5 NG/ML (ref 0.5–3.6)
CK MB SERPL-MCNC: 1.7 NG/ML (ref 0.5–3.6)
CK SERPL-CCNC: 360 U/L (ref 39–308)
CK SERPL-CCNC: 362 U/L (ref 39–308)
GFR SERPLBLD BASED ON 1.73 SQ M-ARVRAT: 107 ML/MIN (ref 89–?)
HCO3 BLD-SCNC: 30.2 MEQ/L (ref 21–32)
POTASSIUM SERPL-SCNC: 3.6 MEQ/L (ref 3.5–5.1)
SODIUM SERPL-SCNC: 135 MEQ/L (ref 136–145)

## 2017-08-30 RX ADMIN — Medication SCH ML: at 21:00

## 2017-08-30 RX ADMIN — NITROGLYCERIN SCH INCH: 20 OINTMENT TOPICAL at 17:37

## 2017-08-30 RX ADMIN — ASPIRIN 81 MG SCH MG: 81 TABLET ORAL at 16:38

## 2017-08-30 RX ADMIN — FOLIC ACID SCH MG: 1 TABLET ORAL at 08:29

## 2017-08-30 RX ADMIN — MORPHINE SULFATE PRN MG: 2 INJECTION, SOLUTION INTRAMUSCULAR; INTRAVENOUS at 16:40

## 2017-08-30 RX ADMIN — Medication SCH ML: at 08:30

## 2017-08-30 RX ADMIN — Medication SCH MG: at 08:29

## 2017-08-30 NOTE — PD
Physical Exam


Narrative


Received sign out that pt can be discharged when he is clinically sober.  





53yo M with PMH of chronic alcohol abuse here under [employer] for alcohol 

intoxication.  Labs reviewed, no leukocytosis.  Hypokalemia at 2.9 replaced 

orally with 40mEq KCl.  Pt was placed on CIWA by previous provider.  I was 

informed by nurse that pt has been requiring multiple doses of ativan for 

alcohol withdrawal.  Pt last received 1mg of ativan at 1:45am.  He has had a 

total of 5mg of ativan.  Pt evaluated at bedside and is tremulous with tongue 

fasciculations.  States started having visual hallucinations about 2 hours ago.

  The walls are moving.   Blood alcohol was 442 at 11:0am and is now 

withdrawing from alcohol.  Will admit pt for DT.  Discussed with Dr. Hargrove and 

accepted to her service.





Data


Data


Last Documented VS





Vital Signs








  Date Time  Temp Pulse Resp B/P (MAP) Pulse Ox O2 Delivery O2 Flow Rate FiO2


 


8/29/17 23:00  102 18 124/84 (97) 94 Nasal Cannula 2.00 


 


8/29/17 13:50 98.4       








Orders





 Orders


Complete Blood Count With Diff (8/29/17 11:09)


Basic Metabolic Panel (Bmp) (8/29/17 11:09)


Alcohol (Ethanol) (8/29/17 11:09)


Salicylates (Aspirin) (8/29/17 11:09)


Tylenol (Acetaminophen) (8/29/17 11:09)


Magnesium (Mg) (8/29/17 12:46)


Potassium Chloride (Kcl) (8/29/17 13:00)


Alcohol Withdrawal Asmt-Ciwa ONCE (8/29/17 14:06)


Flumazenil Inj (Romazicon Inj) (8/29/17 14:15)


Lorazepam (Ativan) (8/29/17 14:15)


Lorazepam Inj (Ativan Inj) (8/29/17 14:15)


Lorazepam (Ativan) (8/29/17 14:15)


Lorazepam Inj (Ativan Inj) (8/29/17 14:15)


Thiamine Inj (Thiamine Inj) (8/30/17 02:15)


Place In Observation (8/30/17 )


Vital Signs (Adult) Q4H (8/30/17 02:20)


Activity Oob With Assistance (8/30/17 02:20)


Cardiac Monitor / Telemetry .CONTINUOUS (8/30/17 02:20)


Diet Heart Healthy (8/30/17 Breakfast)


Sodium Chloride 0.9% Flush (Ns Flush) (8/30/17 02:30)


Sodium Chloride 0.9% Flush (Ns Flush) (8/30/17 09:00)


Basic Metabolic Panel (Bmp) (8/31/17 06:00)


Complete Blood Count With Diff (8/31/17 06:00)


Naloxone Inj (Narcan Inj) (8/30/17 02:30)


^ Seizure Precautions (8/30/17 02:20)


Potassium Chloride (Kcl) (8/30/17 02:30)


Admit Order (Ed Use Only) (8/30/17 02:22)


Thiamine  (Vit B1) (Vitamin B1) (8/30/17 02:30)


Thiamine  (Vit B1) (Vitamin B1) (8/30/17 09:00)





Labs





Laboratory Tests








Test


  8/29/17


11:10


 


White Blood Count 8.4 TH/MM3 


 


Red Blood Count 5.03 MIL/MM3 


 


Hemoglobin 15.7 GM/DL 


 


Hematocrit 46.5 % 


 


Mean Corpuscular Volume 92.4 FL 


 


Mean Corpuscular Hemoglobin 31.1 PG 


 


Mean Corpuscular Hemoglobin


Concent 33.7 % 


 


 


Red Cell Distribution Width 15.0 % 


 


Platelet Count 228 TH/MM3 


 


Mean Platelet Volume 6.9 FL 


 


Neutrophils (%) (Auto) 83.7 % 


 


Lymphocytes (%) (Auto) 6.2 % 


 


Monocytes (%) (Auto) 9.8 % 


 


Eosinophils (%) (Auto) 0.1 % 


 


Basophils (%) (Auto) 0.2 % 


 


Neutrophils # (Auto) 7.1 TH/MM3 


 


Lymphocytes # (Auto) 0.5 TH/MM3 


 


Monocytes # (Auto) 0.8 TH/MM3 


 


Eosinophils # (Auto) 0.0 TH/MM3 


 


Basophils # (Auto) 0.0 TH/MM3 


 


CBC Comment DIFF FINAL 


 


Differential Comment  


 


Blood Urea Nitrogen 10 MG/DL 


 


Creatinine 1.06 MG/DL 


 


Random Glucose 150 MG/DL 


 


Calcium Level 8.3 MG/DL 


 


Sodium Level 133 MEQ/L 


 


Potassium Level 2.9 MEQ/L 


 


Chloride Level 94 MEQ/L 


 


Carbon Dioxide Level 22.5 MEQ/L 


 


Anion Gap 17 MEQ/L 


 


Estimat Glomerular Filtration


Rate 74 ML/MIN 


 


 


Magnesium Level 2.1 MG/DL 


 


Salicylates Level


  LESS THAN 1.7


MG/DL


 


Acetaminophen Level


  LESS THAN 2.0


MCG/ML


 


Ethyl Alcohol Level 442 MG/DL 











MDM


Supervised Visit with FUAD:  Yes


Diagnosis





 Primary Impression:  


 Alcohol intoxication


 Qualified Codes:  F10.929 - Alcohol use, unspecified with intoxication, 

unspecified


 Additional Impression:  


 Hypokalemia





Admitting Information


Admitting Physician Requests:  Observation


Referrals:  


Primary Care Physician


Patient Instructions:  General Instructions, Hypokalemia (ED), Abuse of Alcohol 

(ED)





***Additional Instruction:  


Consume alcohol in moderation


Follow-up with a primary care provider


Return immediately with any acute worsening of symptoms


Scripts


No Active Prescriptions or Reported Meds











Elisabeth Senior DO Aug 30, 2017 02:04

## 2017-08-30 NOTE — RADRPT
EXAM DATE/TIME:  08/30/2017 19:53 

 

[facility] COMPARISON:     

CT PULMONARY ANGIOGRAM, May 15, 2017, 0:35.

 

 

INDICATIONS :     

Chest pain and shortness of breath.

                      

 

IV CONTRAST:     

70 cc Omnipaque 350 (iohexol) IV 

 

 

RADIATION DOSE:     

23.14 CTDIvol (mGy) 

 

 

MEDICAL HISTORY :     

Chronic obstructive pulmonary disease. Hernia, hiatal. 

 

SURGICAL HISTORY :      

None. 

 

ENCOUNTER:      

Initial

 

ACUITY:      

1 day

 

PAIN SCALE:      

7/10

 

LOCATION:       

Bilateral chest 

 

TECHNIQUE:     

Volumetric scanning of the chest was performed using a pulmonary embolism protocol MIP images were re
constructed.  Using automated exposure control and adjustment of the mA and/or kV according to patien
t size, radiation dose was kept as low as reasonably achievable to obtain optimal diagnostic quality 
images.   DICOM format image data is available electronically for review and comparison.  

 

Follow-up recommendations for detected pulmonary nodules are based at a minimum on nodule size and pa
tient risk factors according to Fleischner Society Guidelines.

 

FINDINGS:     

 

PULMONARY ARTERIES:

No filling defects are seen in the pulmonary arteries through the segmental level.

 

LUNGS:     

There is no consolidation or pneumothorax .  No concerning pulmonary nodule is visualized.

 

PLEURAE:     

There is no pleural thickening or pleural effusion.

 

MEDIASTINUM:     

There is good visualization of the great vessels of the middle mediastinum.  No evidence of mediastin
al or hilar adenopathy/mass.

 

MUSCULOSKELETAL:     

Within normal limits for patient age.

 

MISCELLANEOUS:     

The visualized upper abdominal organs demonstrate no acute abnormality.

 

CONCLUSION:     

No pulmonary embolus.

 

 

 

 

 Izaiah Graves MD on August 30, 2017 at 21:08           

Board Certified Radiologist.

 This report was verified electronically.
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
## 2017-08-30 NOTE — RADRPT
EXAM DATE/TIME:  08/30/2017 03:21 

 

[facility] COMPARISON:     

CHEST SINGLE AP, May 14, 2017, 23:24.

 

                     

INDICATIONS :     

Cough.

                     

 

MEDICAL HISTORY :            

Pulmonary embolism.   

 

SURGICAL HISTORY :     

None.   

 

ENCOUNTER:     

Initial                                        

 

ACUITY:     

1 day      

 

PAIN SCORE:     

0/10

 

LOCATION:     

Bilateral chest 

 

FINDINGS:     

Portable AP view of the chest demonstrates a normal-sized cardiac silhouette. No effusion, consolidat
ion, or pneumothorax is visualized. The bones and soft tissues demonstrate no acute abnormality. EKG 
lines overlie the patient.

 

CONCLUSION:     

No acute cardiopulmonary abnormality is identified.

 

 

 

 Izaiah Quiroz MD on August 30, 2017 at 4:10           

Board Certified Radiologist.

 This report was verified electronically.

## 2017-08-31 VITALS
TEMPERATURE: 98.1 F | DIASTOLIC BLOOD PRESSURE: 85 MMHG | RESPIRATION RATE: 18 BRPM | OXYGEN SATURATION: 95 % | HEART RATE: 76 BPM | SYSTOLIC BLOOD PRESSURE: 130 MMHG

## 2017-08-31 VITALS — HEART RATE: 77 BPM

## 2017-08-31 VITALS
HEART RATE: 79 BPM | OXYGEN SATURATION: 97 % | RESPIRATION RATE: 18 BRPM | DIASTOLIC BLOOD PRESSURE: 86 MMHG | SYSTOLIC BLOOD PRESSURE: 128 MMHG | TEMPERATURE: 98.3 F

## 2017-08-31 VITALS — HEART RATE: 80 BPM

## 2017-08-31 VITALS
SYSTOLIC BLOOD PRESSURE: 139 MMHG | RESPIRATION RATE: 17 BRPM | DIASTOLIC BLOOD PRESSURE: 68 MMHG | TEMPERATURE: 98 F | OXYGEN SATURATION: 96 % | HEART RATE: 75 BPM

## 2017-08-31 VITALS — OXYGEN SATURATION: 97 %

## 2017-08-31 VITALS
OXYGEN SATURATION: 96 % | TEMPERATURE: 98.2 F | DIASTOLIC BLOOD PRESSURE: 79 MMHG | RESPIRATION RATE: 18 BRPM | SYSTOLIC BLOOD PRESSURE: 117 MMHG | HEART RATE: 75 BPM

## 2017-08-31 VITALS
RESPIRATION RATE: 18 BRPM | TEMPERATURE: 98.4 F | HEART RATE: 76 BPM | SYSTOLIC BLOOD PRESSURE: 128 MMHG | DIASTOLIC BLOOD PRESSURE: 82 MMHG | OXYGEN SATURATION: 94 %

## 2017-08-31 VITALS — HEART RATE: 78 BPM

## 2017-08-31 VITALS — OXYGEN SATURATION: 92 %

## 2017-08-31 VITALS — HEART RATE: 71 BPM

## 2017-08-31 VITALS — HEART RATE: 75 BPM

## 2017-08-31 LAB
ALP SERPL-CCNC: 194 U/L (ref 45–117)
ALT SERPL-CCNC: 93 U/L (ref 12–78)
ANION GAP SERPL CALC-SCNC: 9 MEQ/L (ref 5–15)
AST SERPL-CCNC: 110 U/L (ref 15–37)
BASOPHILS # BLD AUTO: 0 TH/MM3 (ref 0–0.2)
BASOPHILS NFR BLD: 0.4 % (ref 0–2)
BILIRUB INDIRECT SERPL-MCNC: 0.6 MG/DL (ref 0–0.8)
BILIRUB SERPL-MCNC: 0.8 MG/DL (ref 0.2–1)
BUN SERPL-MCNC: 13 MG/DL (ref 7–18)
CHLORIDE SERPL-SCNC: 100 MEQ/L (ref 98–107)
EOSINOPHIL # BLD: 0.2 TH/MM3 (ref 0–0.4)
EOSINOPHIL NFR BLD: 3.7 % (ref 0–4)
ERYTHROCYTE [DISTWIDTH] IN BLOOD BY AUTOMATED COUNT: 15.2 % (ref 11.6–17.2)
GFR SERPLBLD BASED ON 1.73 SQ M-ARVRAT: 113 ML/MIN (ref 89–?)
HCO3 BLD-SCNC: 26.9 MEQ/L (ref 21–32)
HCT VFR BLD CALC: 41.1 % (ref 39–51)
HDLC SERPL-MCNC: 94.8 MG/DL (ref 40–60)
HEMO FLAGS: (no result)
LDLC SERPL-MCNC: 97 MG/DL (ref 0–99)
LYMPHOCYTES # BLD AUTO: 0.6 TH/MM3 (ref 1–4.8)
LYMPHOCYTES NFR BLD AUTO: 12.3 % (ref 9–44)
MCH RBC QN AUTO: 32 PG (ref 27–34)
MCHC RBC AUTO-ENTMCNC: 34.6 % (ref 32–36)
MCV RBC AUTO: 92.5 FL (ref 80–100)
MONOCYTES NFR BLD: 9 % (ref 0–8)
NEUTROPHILS # BLD AUTO: 3.6 TH/MM3 (ref 1.8–7.7)
NEUTROPHILS NFR BLD AUTO: 74.6 % (ref 16–70)
PLATELET # BLD: 145 TH/MM3 (ref 150–450)
POTASSIUM SERPL-SCNC: 3.5 MEQ/L (ref 3.5–5.1)
RBC # BLD AUTO: 4.44 MIL/MM3 (ref 4.5–5.9)
SODIUM SERPL-SCNC: 136 MEQ/L (ref 136–145)
WBC # BLD AUTO: 4.8 TH/MM3 (ref 4–11)

## 2017-08-31 RX ADMIN — Medication SCH ML: at 21:00

## 2017-08-31 RX ADMIN — ASPIRIN 81 MG SCH MG: 81 TABLET ORAL at 09:44

## 2017-08-31 RX ADMIN — Medication SCH MG: at 09:44

## 2017-08-31 RX ADMIN — Medication SCH ML: at 09:45

## 2017-08-31 RX ADMIN — NITROGLYCERIN SCH INCH: 20 OINTMENT TOPICAL at 06:00

## 2017-08-31 RX ADMIN — PHENYTOIN SODIUM SCH MLS/HR: 50 INJECTION INTRAMUSCULAR; INTRAVENOUS at 15:16

## 2017-08-31 RX ADMIN — FOLIC ACID SCH MG: 1 TABLET ORAL at 09:44

## 2017-08-31 RX ADMIN — NITROGLYCERIN SCH INCH: 20 OINTMENT TOPICAL at 00:00

## 2017-08-31 NOTE — EKG
Date Performed: 08/30/2017       Time Performed: 23:05:15

 

PTAGE:      52 years

 

EKG:      Sinus rhythm 

 

 NORMAL ECG Compared to prior tracing no significant change 

 

 PREVIOUS TRACING            : 08/30/2017 17.09

 

DOCTOR:   Ludwig Estrella  Interpretating Date/Time  08/31/2017 17:50:56

## 2017-08-31 NOTE — EKG
Date Performed: 08/30/2017       Time Performed: 15:02:37

 

PTAGE:      52 years

 

EKG:      Sinus rhythm 

 

 NORMAL ECG INTERPRETATION BASED ON A DEFAULT AGE OF 40 YEARS Compared to prior tracing no significan
t change 

 

 PREVIOUS TRACING            : 05/15/2017    02.32.19

 

DOCTOR:   Ludwig Estrella  Interpretating Date/Time  08/31/2017 17:49:37
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
## 2017-08-31 NOTE — HHI.PR
Subjective


Remarks


Follow-up for alcohol withdrawal.  The patient continues to complain of feeling 

poorly.  He has pain all over.  He states she's been vomiting today, discussed 

with RN, no vomiting noted.  He has not tried anything for nausea yet.  He 

continues to report tremors, unchanged.  He continues reported hallucinations, 

unchanged.  He continues to have chest pain that hurts whenever he touches his 

chest.  He is asking for more Librium and Ativan.





Objective


Vitals





Vital Signs








  Date Time  Temp Pulse Resp B/P (MAP) Pulse Ox O2 Delivery O2 Flow Rate FiO2


 


8/31/17 11:51 98.3 79 18 128/86 (100) 97   


 


8/31/17 07:40     92   21


 


8/31/17 07:14 98.4 76 18 128/82 (97) 94   


 


8/31/17 04:36 98.0 75 17 139/68 (91) 96   


 


8/31/17 04:05  80      


 


8/31/17 00:08  71      


 


8/30/17 23:57 98.2 80 16 136/69 (91) 95   


 


8/30/17 20:57  73      


 


8/30/17 20:48 98.6 86 19 130/68 (88) 96   


 


8/30/17 15:04 98.8 85 20 133/85 (101) 95   














I/O      


 


 8/30/17 8/30/17 8/30/17 8/31/17 8/31/17 8/31/17





 07:00 15:00 23:00 07:00 15:00 23:00


 


Intake Total  101 ml    


 


Balance  101 ml    


 


      


 


IV Total  101 ml    


 


# Voids 1     








Result Diagram:  


8/31/17 0747                                                                   

             8/31/17 0747





Imaging





Last Impressions








Chest X-Ray 8/30/17 0000 Signed





Impressions: 





 Service Date/Time:  Wednesday, August 30, 2017 03:21 - CONCLUSION:  No acute 





 cardiopulmonary abnormality is identified.     Izaiah Quiroz MD 


 


CT Angiography 8/30/17 0000 Signed





Impressions: 





 Service Date/Time:  Wednesday, August 30, 2017 19:53 - CONCLUSION:  No 

pulmonary 





 embolus.      Izaiah Graves MD 








Objective Remarks


GENERAL: Well-developed well-nourished.  In no acute distress.


SKIN: Warm and dry. No lesions noted.


HEENT: Normocephalic. Pupils equal and round. Mucous membranes pink and moist. 


CARDIOVASCULAR: Regular rate and regular rhythm.  No murmur appreciated.  Chest 

wall TTP.


RESPIRATORY: No accessory muscle use. Clear to auscultation. Breath sounds 

equal bilaterally. 


GASTROINTESTINAL: Abdomen soft, non-tender, nondistended. Bowel sounds x4.


MUSCULOSKELETAL: No obvious deformities. No clubbing or cyanosis. No edema. 


NEUROLOGICAL: Awake and alert. Moves upper and lower extremities spontaneously. 

Normal speech.  Moderately tremulous.


PSYCHIATRIC: Appropriate mood and affect; insight and judgment normal.





A/P


Assessment and Plan


52-year-old male with a past medical history of alcohol abuse presents for 

alcohol withdrawals





Acute alcohol withdrawal: Vital signs improved today.  Patient had been having 

tachycardia, tremors, hallucinations.


   Continue Librium to 50 mg 3 times a day and if no seizure-like activity, 

plan to begin tapering tomorrow


   Ativan by [facility] protocol


   Thiamine, folate


   IVF


   Seizure precautions


   Monitor on telemetry


   Case management consult for discharge plan





Atypical chest pain: Patient reports nurse that he had been having crushing 8/

10 chest discomfort.  Chest pain is reproducible to palpation.  


Reviewed: EKG showed NSR with no acute ST changes.  Troponins 0.13, 0.11, 0.09, 

0.07; possibly secondary to demand from tachycardia.  Lipid profile within 

normal limits with favorable HDL.  Pulmonary angiogram showed no PE.


   Cardiology consulted, discussed with Dr. Wolff, continue aspirin and 

consider stress testing when clinically improved





Hypokalemia: Potassium 2.9.  Given 80 mEq in the ED.  Magnesium within normal 

limits.


   Repeat labs today and replace electrolytes as needed





DVT prophylaxis: Lovenox


Discharge Planning


Continue inpatient treatment for acute alcohol withdrawal and delirium tremens.

  Needs outpatient substance abuse counseling after discharge.











Mandeep Nelson Aug 31, 2017 14:33

## 2017-08-31 NOTE — PD.CARD.PN
Subjective


Subjective Remarks


No events overnight





CTA negative for PE





Pain which is relieved by morphine





Objective


Medications





Current Medications








 Medications


  (Trade)  Dose


 Ordered  Sig/Devon


 Route  Start Time


 Stop Time Status Last Admin


 


  (Romazicon Inj)  0.2 mg  Q1M  PRN


 IV PUSH  8/29/17 14:15


     


 


 


  (Ativan)  1 mg  Q4H  PRN


 PO  8/29/17 14:15


    8/31/17 09:54


 


 


  (Ativan Inj)  1 mg  Q4H  PRN


 IV PUSH  8/29/17 14:15


    8/31/17 11:34


 


 


  (Ativan)  2 mg  Q2H  PRN


 PO  8/29/17 14:15


    8/31/17 01:37


 


 


  (Ativan Inj)  2 mg  Q2H  PRN


 IV PUSH  8/29/17 14:15


    8/31/17 07:43


 


 


  (Ativan Inj)  2 mg  Q1H  PRN


 IV PUSH  8/29/17 14:15


    8/30/17 17:44


 


 


  (Ativan Inj)  2 mg  Q15M  PRN


 IV PUSH  8/29/17 14:15


    8/30/17 09:51


 


 


  (NS Flush)  2 ml  UNSCH  PRN


 IV FLUSH  8/30/17 02:30


     


 


 


  (NS Flush)  2 ml  BID


 IV FLUSH  8/30/17 09:00


    8/31/17 09:45


 


 


  (Narcan Inj)  0.4 mg  UNSCH  PRN


 IV  8/30/17 02:30


     


 


 


  (Vitamin B1)  100 mg  DAILY


 PO  8/30/17 09:00


    8/31/17 09:44


 


 


  (Folate)  1 mg  DAILY


 PO  8/30/17 09:00


    8/31/17 09:44


 


 


  (Librium)  50 mg  TID


 PO  8/30/17 13:00


    8/31/17 09:44


 


 


  (Motrin)  400 mg  Q6H  PRN


 PO  8/30/17 15:15


    8/30/17 15:31


 


 


  (Nitroglycerin


 2% Oint)  1 inch  Q6HR


 TOP  8/30/17 18:00


    8/30/17 17:37


 


 


  (Morphine Inj)  2 mg  Q4H  PRN


 IV PUSH  8/30/17 16:15


    8/30/17 16:40


 


 


  (Aspirin Chew)  81 mg  DAILY


 CHEW  8/30/17 16:15


    8/31/17 09:44


 


 


  (Lovenox Inj)  40 mg  Q24H


 SQ  9/1/17 04:00


     


 








Vital Signs / I&O





Vital Signs








  Date Time  Temp Pulse Resp B/P (MAP) Pulse Ox O2 Delivery O2 Flow Rate FiO2


 


8/31/17 11:51 98.3 79 18 128/86 (100) 97   


 


8/31/17 07:40     92   21


 


8/31/17 07:14 98.4 76 18 128/82 (97) 94   


 


8/31/17 04:36 98.0 75 17 139/68 (91) 96   


 


8/31/17 04:05  80      


 


8/31/17 00:08  71      


 


8/30/17 23:57 98.2 80 16 136/69 (91) 95   


 


8/30/17 20:57  73      


 


8/30/17 20:48 98.6 86 19 130/68 (88) 96   


 


8/30/17 15:04 98.8 85 20 133/85 (101) 95   














I/O      


 


 8/30/17 8/30/17 8/30/17 8/31/17 8/31/17 8/31/17





 07:00 15:00 23:00 07:00 15:00 23:00


 


Intake Total  101 ml    


 


Balance  101 ml    


 


      


 


IV Total  101 ml    


 


# Voids 1     








Physical Exam


GENERAL: Extremely anxious and tremulous


SKIN: Warm and dry.


HEAD: Atraumatic. Normocephalic. 


EYES: Pupils equal and round. No scleral icterus. No injection or drainage. 


ENT: No nasal bleeding or discharge.  Mucous membranes pink and moist.


NECK: Trachea midline. No JVD. 


CARDIOVASCULAR: Regular rate and rhythm.  


RESPIRATORY: No accessory muscle use. Clear to auscultation. Breath sounds 

equal bilaterally. 


GASTROINTESTINAL: Abdomen soft, non-tender, nondistended. Hepatic and splenic 

margins not palpable. 


MUSCULOSKELETAL: Extremities without clubbing, cyanosis, or edema. No obvious 

deformities. 


NEUROLOGICAL: Awake and alert. No obvious cranial nerve deficits.  Motor 

grossly within normal limits. Five out of 5 muscle strength in the arms and 

legs.  Normal speech.


PSYCHIATRIC: Appropriate mood and affect; insight and judgment normal.


Laboratory





Laboratory Tests








Test


  8/30/17


16:56 8/30/17


20:08 8/31/17


07:47


 


Total Creatine Kinase 360 U/L  362 U/L  


 


Creatine Kinase MB 1.7 NG/ML  1.5 NG/ML  


 


Creatine Kinase MB % 0.5 %  0.4 %  


 


Troponin I 0.11 NG/ML  0.09 NG/ML  0.07 NG/ML 


 


White Blood Count   4.8 TH/MM3 


 


Red Blood Count   4.44 MIL/MM3 


 


Hemoglobin   14.2 GM/DL 


 


Hematocrit   41.1 % 


 


Mean Corpuscular Volume   92.5 FL 


 


Mean Corpuscular Hemoglobin   32.0 PG 


 


Mean Corpuscular Hemoglobin


Concent 


  


  34.6 % 


 


 


Red Cell Distribution Width   15.2 % 


 


Platelet Count   145 TH/MM3 


 


Mean Platelet Volume   7.8 FL 


 


Neutrophils (%) (Auto)   74.6 % 


 


Lymphocytes (%) (Auto)   12.3 % 


 


Monocytes (%) (Auto)   9.0 % 


 


Eosinophils (%) (Auto)   3.7 % 


 


Basophils (%) (Auto)   0.4 % 


 


Neutrophils # (Auto)   3.6 TH/MM3 


 


Lymphocytes # (Auto)   0.6 TH/MM3 


 


Monocytes # (Auto)   0.4 TH/MM3 


 


Eosinophils # (Auto)   0.2 TH/MM3 


 


Basophils # (Auto)   0.0 TH/MM3 


 


CBC Comment   DIFF FINAL 


 


Differential Comment    


 


Blood Urea Nitrogen   13 MG/DL 


 


Creatinine   0.73 MG/DL 


 


Random Glucose   91 MG/DL 


 


Total Protein   6.6 GM/DL 


 


Albumin   3.1 GM/DL 


 


Calcium Level   8.8 MG/DL 


 


Alkaline Phosphatase   194 U/L 


 


Aspartate Amino Transf


(AST/SGOT) 


  


  110 U/L 


 


 


Alanine Aminotransferase


(ALT/SGPT) 


  


  93 U/L 


 


 


Total Bilirubin   0.8 MG/DL 


 


Direct Bilirubin   0.2 MG/DL 


 


Sodium Level   136 MEQ/L 


 


Potassium Level   3.5 MEQ/L 


 


Chloride Level   100 MEQ/L 


 


Carbon Dioxide Level   26.9 MEQ/L 


 


Anion Gap   9 MEQ/L 


 


Estimat Glomerular Filtration


Rate 


  


  113 ML/MIN 


 


 


Indirect Bilirubin   0.6 MG/DL 


 


Triglycerides Level   103 MG/DL 


 


Cholesterol Level   212 MG/DL 


 


LDL Cholesterol   97 MG/DL 


 


HDL Cholesterol   94.8 MG/DL 


 


Cholesterol/HDL Ratio   2.23 RATIO 











Assessment and Plan


Problem List:  


(1) Alcohol intoxication


ICD Codes:  F10.929 - Alcohol use, unspecified with intoxication, unspecified


Status:  Acute


(2) Hypokalemia


ICD Codes:  E87.6 - Hypokalemia


Status:  Acute


(3) Atypical chest pain


ICD Codes:  R07.89 - Other chest pain


Status:  Acute


(4) Anxiety


ICD Codes:  F41.9 - Anxiety disorder, unspecified


Status:  Acute


Assessment and Plan


1) Chest pain appears non-coronary in nature


   Reproducible and relieved with morphine


2) ETOH withdrawal per primary team


3) Depending on the hospital course, will consider further ischemic work up vs 

medical management


4) Check 2D echo





Problem Qualifiers





(1) Alcohol intoxication:  


Qualified Codes:  F10.929 - Alcohol use, unspecified with intoxication, 

unspecified








Joseph Hunter DO Aug 31, 2017 12:26

## 2017-08-31 NOTE — EKG
Date Performed: 08/30/2017       Time Performed: 17:09:46

 

PTAGE:      52 years

 

EKG:      Sinus rhythm 

 

 NORMAL ECG INTERPRETATION BASED ON A DEFAULT AGE OF 40 YEARS Compared to prior tracing no significan
t change 

 

 PREVIOUS TRACING            : 08/30/2017    15.02.37

 

DOCTOR:   Ludwig Estrella  Interpretating Date/Time  08/31/2017 17:50:40

## 2017-09-01 VITALS
DIASTOLIC BLOOD PRESSURE: 84 MMHG | OXYGEN SATURATION: 95 % | SYSTOLIC BLOOD PRESSURE: 123 MMHG | HEART RATE: 70 BPM | RESPIRATION RATE: 18 BRPM | TEMPERATURE: 98.2 F

## 2017-09-01 VITALS
OXYGEN SATURATION: 94 % | RESPIRATION RATE: 16 BRPM | SYSTOLIC BLOOD PRESSURE: 120 MMHG | DIASTOLIC BLOOD PRESSURE: 82 MMHG | HEART RATE: 69 BPM | TEMPERATURE: 98.2 F

## 2017-09-01 VITALS — HEART RATE: 77 BPM

## 2017-09-01 VITALS
TEMPERATURE: 98.4 F | OXYGEN SATURATION: 98 % | SYSTOLIC BLOOD PRESSURE: 129 MMHG | RESPIRATION RATE: 18 BRPM | DIASTOLIC BLOOD PRESSURE: 87 MMHG | HEART RATE: 74 BPM

## 2017-09-01 VITALS
RESPIRATION RATE: 16 BRPM | DIASTOLIC BLOOD PRESSURE: 51 MMHG | HEART RATE: 105 BPM | TEMPERATURE: 97.6 F | OXYGEN SATURATION: 91 % | SYSTOLIC BLOOD PRESSURE: 98 MMHG

## 2017-09-01 VITALS
TEMPERATURE: 98.3 F | SYSTOLIC BLOOD PRESSURE: 132 MMHG | OXYGEN SATURATION: 97 % | HEART RATE: 76 BPM | RESPIRATION RATE: 16 BRPM | DIASTOLIC BLOOD PRESSURE: 83 MMHG

## 2017-09-01 VITALS
OXYGEN SATURATION: 97 % | HEART RATE: 74 BPM | TEMPERATURE: 97.3 F | SYSTOLIC BLOOD PRESSURE: 129 MMHG | RESPIRATION RATE: 18 BRPM | DIASTOLIC BLOOD PRESSURE: 85 MMHG

## 2017-09-01 VITALS
DIASTOLIC BLOOD PRESSURE: 82 MMHG | SYSTOLIC BLOOD PRESSURE: 123 MMHG | HEART RATE: 71 BPM | TEMPERATURE: 98.6 F | RESPIRATION RATE: 20 BRPM | OXYGEN SATURATION: 95 %

## 2017-09-01 VITALS — HEART RATE: 67 BPM

## 2017-09-01 VITALS
RESPIRATION RATE: 18 BRPM | DIASTOLIC BLOOD PRESSURE: 80 MMHG | TEMPERATURE: 98.1 F | SYSTOLIC BLOOD PRESSURE: 119 MMHG | OXYGEN SATURATION: 95 % | HEART RATE: 69 BPM

## 2017-09-01 VITALS — HEART RATE: 71 BPM

## 2017-09-01 VITALS — HEART RATE: 97 BPM

## 2017-09-01 RX ADMIN — Medication SCH ML: at 23:36

## 2017-09-01 RX ADMIN — ENOXAPARIN SODIUM SCH MG: 40 INJECTION SUBCUTANEOUS at 03:51

## 2017-09-01 RX ADMIN — MORPHINE SULFATE PRN MG: 2 INJECTION, SOLUTION INTRAMUSCULAR; INTRAVENOUS at 08:32

## 2017-09-01 RX ADMIN — FOLIC ACID SCH MG: 1 TABLET ORAL at 08:31

## 2017-09-01 RX ADMIN — ASPIRIN 81 MG SCH MG: 81 TABLET ORAL at 08:31

## 2017-09-01 RX ADMIN — Medication SCH ML: at 08:31

## 2017-09-01 RX ADMIN — Medication SCH MG: at 08:31

## 2017-09-01 RX ADMIN — PHENYTOIN SODIUM SCH MLS/HR: 50 INJECTION INTRAMUSCULAR; INTRAVENOUS at 03:50

## 2017-09-01 NOTE — ECHRPT
Indication:   chest pain

 

 CONCLUSIONS

 Mildly dilated left ventricle. 

 Wall thickness is normal. 

 The right ventricular systoilc function is mildly decreased. 

 There is mild tricuspid valve regurgitation. 

 The estimated pulmonary arterial pressure is 26 mmHg. 

 Mildly dilated left ventricle. 

 Wall thickness is normal. 

 The left ventricular systolic function is mildly reduced with an estimated ejection fraction in the 
range of 45-

 50%. 

 The right ventricular systoilc function is mildly decreased. 

 There is mild tricuspid valve regurgitation. 

 The estimated pulmonary arterial pressure is 26 mmHg. 

 The pulmonary valve is not well visualized.  

 

 BP:  130   / 85      HR: 105                      Rhythm:           Sinus

 

 MEASUREMENTS  (Male / Female) Normal Values       Technical Quality:Good

 2D ECHO

 LV Diastolic Diameter PLAX        5.2 cm                4.2 - 5.9 / 3.9 - 5.3 cm

 LV Systolic Diameter PLAX         4.4 cm                

 IVS Diastolic Thickness           0.9 cm                0.6 - 1.0 / 0.6 - 0.9 cm

 LVPW Diastolic Thickness          0.9 cm                0.6 - 1.0 / 0.6 - 0.9 cm

 LV Relative Wall Thickness        0.3                   

 RV Internal Dim ED PLAX           1.9 cm                

 LV Ejection Fraction MOD 4C       44.6 %                

 LV Cardiac Index MOD 4C           2159.6 cm/minm     

 LV Ejection Fraction 4C AL        43.7 %                

 LV Cardiac Index 4C AL            2163.1 cm/minm     

 

 M-MODE

 Aortic Root Diameter MM           3.3 cm                

 AV Cusp Separation MM             2.2 cm                

 

 DOPPLER

 Mitral E Point Velocity           58.7 cm/s             

 Mitral A Point Velocity           74.0 cm/s             

 Mitral E to A Ratio               0.8                   

 TR Peak Velocity                  200.0 cm/s            

 TR Peak Gradient                  16.0 mmHg             

 

 

 FINDINGS

 

 LEFT VENTRICLE

 Mildly dilated left ventricle. 

 Wall thickness is normal. 

 

 The left ventricular systolic function is mildly reduced with an estimated ejection fraction in the 
range of 45-

 50%. 

 

 RIGHT VENTRICLE

 The right ventricular systoilc function is mildly decreased. 

 

 LEFT ATRIUM

 The left atrial size is normal.  

 

 RIGHT ATRIUM

 The right atrial size is normal.  

 

 ATRIAL SEPTUM

 Normal atrial septal thickness without atrial level shunting by limited color doppler interrogation.
  

 

 AORTA

 The aortic root and proximal ascending aorta are normal in size on limited imaging.  

 

 MITRAL VALVE

 Structurally normal mitral valve. No mitral valve stenosis or regurgitation.  

 

 AORTIC VALVE

 Trileaflet aortic valve. No aortic valve stenosis or regurgitation.  

 

 TRICUSPID VALVE

 There is mild tricuspid valve regurgitation. 

 The estimated pulmonary arterial pressure is 26 mmHg. 

 

 PULMONARY VALVE

 The pulmonary valve is not well visualized.  

 

 VESSELS

 The inferior vena cava is normal in size.  

 

 PERICARDIUM

 No pericardial effusion.  

 

 

 

 

  Karyn Brooks MD, FACC

  (Electronically Signed)

  Final Date:01 September 2017 15:36

## 2017-09-01 NOTE — HHI.PR
Subjective


Remarks


On my evaluation today, looking from the door, the patient appears very 

comfortable in no acute distress.  When I started talking to him.  He proceeded 

to show me how he is tremulous.  However as the conversation went on it was 

apparent that whenever he is not focusing on "tremors", there is no tremors.  

He reports that he is hallucinating but cannot elaborate on that.  I discussed 

with RN as well who indicated that he is exhibiting benzodiazepine and narcotic 

seeking behaviors.





Objective


Vitals





Vital Signs








  Date Time  Temp Pulse Resp B/P (MAP) Pulse Ox O2 Delivery O2 Flow Rate FiO2


 


9/1/17 09:08 98.2 69 16 120/82 (95) 94   


 


9/1/17 06:12 97.6 105 16 98/51 (67) 91   


 


9/1/17 04:13 98.1 69 18 119/80 (93) 95   


 


9/1/17 04:05  67      


 


9/1/17 01:11 98.2 70 18 123/84 (97) 95   


 


9/1/17 00:04  71      


 


8/31/17 20:20  75      


 


8/31/17 20:09     97   21


 


8/31/17 19:39 98.2 75 18 117/79 (92) 96   


 


8/31/17 17:39  77      


 


8/31/17 15:45 98.1 76 18 130/85 (100) 95   


 


8/31/17 12:11  78      


 


8/31/17 11:51 98.3 79 18 128/86 (100) 97   














I/O      


 


 8/31/17 8/31/17 8/31/17 9/1/17 9/1/17 9/1/17





 06:59 14:59 22:59 06:59 14:59 22:59


 


Output Total   275 ml   


 


Balance   -275 ml   


 


      


 


Output Urine Total   275 ml   


 


# Voids    1  








Result Diagram:  


8/31/17 0747                                                                   

             8/31/17 0747





Imaging





Last Impressions








Chest X-Ray 8/30/17 0000 Signed





Impressions: 





 Service Date/Time:  Wednesday, August 30, 2017 03:21 - CONCLUSION:  No acute 





 cardiopulmonary abnormality is identified.     Izaiah Quiroz MD 


 


CT Angiography 8/30/17 0000 Signed





Impressions: 





 Service Date/Time:  Wednesday, August 30, 2017 19:53 - CONCLUSION:  No 

pulmonary 





 embolus.      Izaiah Graves MD 








Objective Remarks


GENERAL: This is a well-nourished, well-developed patient, in no apparent 

distress.


CARDIOVASCULAR: Normal rate and regular rhythm without murmurs, gallops, or 

rubs. 


RESPIRATORY: Good respiratory efforts. Breath sounds equal and clear to 

auscultation bilaterally.


GASTROINTESTINAL: Abdomen soft, non-tender, non-distended. Normal active bowel 

sounds


MUSCULOSKELETAL: Extremities without cyanosis, or edema.


NEURO:  Alert & Oriented x4 to person, place, time, situation.  Moves all ext 

x4.  At the very least I believe the patient I purposefully shake his hands to 

give the impression of tremors or he is exaggerating any underlying tremors.


PSYCH: Appropriate mood and affect.





A/P


Assessment and Plan


52-year-old male with a past medical history of alcohol abuse presents for 

alcohol withdrawals.  Although initially the patient probably had alcohol 

withdrawal symptoms.  I believe he is now exaggerating or producing symptoms of 

DTs in order to get more benzodiazepines and narcotics.





Alcohol withdrawal: No objective findings to indicate delirium tremens at this 

point.  I am not convinced he is hallucinating.  The reported tremors appear to 

be produced in order to get more benzodiazepines.


   - Discontinue Ativan.


            - Start weaning off Librium. Decrease to 25 mg TID PRN. 


   - Thiamine, folate


   Seizure precautions


   Monitor on telemetry


   Case management consult for discharge plan


            - Patient counseled on the detrimental effect of alcohol on his 

health.  He is advised to seek alcohol rehabilitation on discharge.





Physical deconditioning: Related to chronic alcohol abuse as above.  Probable 

peripheral neuropathy related to that.


- Continue physical therapy.





Atypical chest pain:  


Reviewed: EKG showed NSR with no acute ST changes.  Troponin 0.13, 0.11, 0.09, 

0.07; possibly secondary to demand from tachycardia.  Lipid profile within 

normal limits with favorable HDL.  Pulmonary angiogram showed no PE.


   Cardiology following, 2d echo ordered. Continue Aspirin.  Pain is 

reproducible on exam.  Doubt coronary syndrome. DC Morphine. Keep Ibuprofen 

PRN. 





DVT prophylaxis: Lovenox


Discharge Planning


Continue rehabilitation efforts for physical deconditioning.  Wean off 

benzodiazepines.  The patient should be cleared for discharge once PT 

determines he can ambulate safely at home.  Ideally he should go to [facility] for further alcohol dependence treatment. He is open to that.











Griselda Smith MD Sep 1, 2017 11:32

## 2017-09-01 NOTE — PD.CARD.PN
Subjective


Subjective Remarks


No events overnight





Still feels he's withdrawing





Objective


Medications





Current Medications








 Medications


  (Trade)  Dose


 Ordered  Sig/Devon


 Route  Start Time


 Stop Time Status Last Admin


 


  (NS Flush)  2 ml  UNSCH  PRN


 IV FLUSH  8/30/17 02:30


     


 


 


  (NS Flush)  2 ml  BID


 IV FLUSH  8/30/17 09:00


    9/1/17 08:31


 


 


  (Narcan Inj)  0.4 mg  UNSCH  PRN


 IV  8/30/17 02:30


     


 


 


  (Vitamin B1)  100 mg  DAILY


 PO  8/30/17 09:00


    9/1/17 08:31


 


 


  (Folate)  1 mg  DAILY


 PO  8/30/17 09:00


    9/1/17 08:31


 


 


  (Aspirin Chew)  81 mg  DAILY


 CHEW  8/30/17 16:15


    9/1/17 08:31


 


 


  (Lovenox Inj)  40 mg  Q24H


 SQ  9/1/17 04:00


    9/1/17 03:51


 


 


  (Librium)  25 mg  TID


 PO  9/1/17 13:00


     


 


 


  (Motrin)  800 mg  Q6H  PRN


 PO  9/1/17 15:15


   UNV  


 








Vital Signs / I&O





Vital Signs








  Date Time  Temp Pulse Resp B/P (MAP) Pulse Ox O2 Delivery O2 Flow Rate FiO2


 


9/1/17 09:08 98.2 69 16 120/82 (95) 94   


 


9/1/17 06:12 97.6 105 16 98/51 (67) 91   


 


9/1/17 04:13 98.1 69 18 119/80 (93) 95   


 


9/1/17 04:05  67      


 


9/1/17 01:11 98.2 70 18 123/84 (97) 95   


 


9/1/17 00:04  71      


 


8/31/17 20:20  75      


 


8/31/17 20:09     97   21


 


8/31/17 19:39 98.2 75 18 117/79 (92) 96   


 


8/31/17 17:39  77      


 


8/31/17 15:45 98.1 76 18 130/85 (100) 95   














I/O      


 


 8/31/17 8/31/17 8/31/17 9/1/17 9/1/17 9/1/17





 07:00 15:00 23:00 07:00 15:00 23:00


 


Output Total   275 ml   


 


Balance   -275 ml   


 


      


 


Output Urine Total   275 ml   


 


# Voids    1  








Physical Exam


GENERAL: Extremely anxious and tremulous


SKIN: Warm and dry.


HEAD: Atraumatic. Normocephalic. 


EYES: Pupils equal and round. No scleral icterus. No injection or drainage. 


ENT: No nasal bleeding or discharge.  Mucous membranes pink and moist.


NECK: Trachea midline. No JVD. 


CARDIOVASCULAR: Regular rate and rhythm.  


RESPIRATORY: No accessory muscle use. Clear to auscultation. Breath sounds 

equal bilaterally. 


GASTROINTESTINAL: Abdomen soft, non-tender, nondistended. Hepatic and splenic 

margins not palpable. 


MUSCULOSKELETAL: Extremities without clubbing, cyanosis, or edema. No obvious 

deformities. 


NEUROLOGICAL: Awake and alert. No obvious cranial nerve deficits.  Motor 

grossly within normal limits. Five out of 5 muscle strength in the arms and 

legs.  Normal speech.


PSYCHIATRIC: Appropriate mood and affect; insight and judgment normal.





Assessment and Plan


Problem List:  


(1) Alcohol intoxication


ICD Codes:  F10.929 - Alcohol use, unspecified with intoxication, unspecified


Status:  Acute


(2) Hypokalemia


ICD Codes:  E87.6 - Hypokalemia


Status:  Acute


(3) Atypical chest pain


ICD Codes:  R07.89 - Other chest pain


Status:  Acute


(4) Anxiety


ICD Codes:  F41.9 - Anxiety disorder, unspecified


Status:  Acute


Assessment and Plan


1) Chest pain appears non-coronary in nature


   Reproducible and relieved with morphine


2) ETOH withdrawal per primary team


3) Depending on the hospital course, will consider further ischemic work up vs 

medical management


   Overall concern with patients ability to follow up and take medications 

appropriately


   Once through withdrawal, if he appears to understand the needs of taking his 

medications then would consider stress testing


4) Check 2D echo





Problem Qualifiers





(1) Alcohol intoxication:  


Qualified Codes:  F10.929 - Alcohol use, unspecified with intoxication, 

unspecified








Joseph Hunter DO Sep 1, 2017 12:35

## 2017-09-02 VITALS
DIASTOLIC BLOOD PRESSURE: 88 MMHG | SYSTOLIC BLOOD PRESSURE: 128 MMHG | RESPIRATION RATE: 19 BRPM | TEMPERATURE: 98.4 F | HEART RATE: 92 BPM | OXYGEN SATURATION: 94 %

## 2017-09-02 VITALS — HEART RATE: 71 BPM

## 2017-09-02 VITALS
OXYGEN SATURATION: 95 % | SYSTOLIC BLOOD PRESSURE: 131 MMHG | TEMPERATURE: 98.4 F | HEART RATE: 67 BPM | DIASTOLIC BLOOD PRESSURE: 85 MMHG | RESPIRATION RATE: 20 BRPM

## 2017-09-02 VITALS
DIASTOLIC BLOOD PRESSURE: 83 MMHG | SYSTOLIC BLOOD PRESSURE: 134 MMHG | OXYGEN SATURATION: 96 % | HEART RATE: 64 BPM | RESPIRATION RATE: 19 BRPM | TEMPERATURE: 98.1 F

## 2017-09-02 VITALS
DIASTOLIC BLOOD PRESSURE: 82 MMHG | RESPIRATION RATE: 20 BRPM | SYSTOLIC BLOOD PRESSURE: 120 MMHG | OXYGEN SATURATION: 94 % | HEART RATE: 69 BPM | TEMPERATURE: 97.8 F

## 2017-09-02 VITALS
SYSTOLIC BLOOD PRESSURE: 134 MMHG | TEMPERATURE: 97.9 F | HEART RATE: 71 BPM | OXYGEN SATURATION: 95 % | DIASTOLIC BLOOD PRESSURE: 86 MMHG | RESPIRATION RATE: 18 BRPM

## 2017-09-02 VITALS
SYSTOLIC BLOOD PRESSURE: 134 MMHG | RESPIRATION RATE: 17 BRPM | DIASTOLIC BLOOD PRESSURE: 86 MMHG | OXYGEN SATURATION: 94 % | TEMPERATURE: 97.9 F | HEART RATE: 79 BPM

## 2017-09-02 VITALS — OXYGEN SATURATION: 97 %

## 2017-09-02 RX ADMIN — Medication SCH ML: at 09:36

## 2017-09-02 RX ADMIN — Medication SCH MG: at 09:36

## 2017-09-02 RX ADMIN — IBUPROFEN PRN MG: 400 TABLET ORAL at 20:16

## 2017-09-02 RX ADMIN — ENOXAPARIN SODIUM SCH MG: 40 INJECTION SUBCUTANEOUS at 06:16

## 2017-09-02 RX ADMIN — FOLIC ACID SCH MG: 1 TABLET ORAL at 09:36

## 2017-09-02 RX ADMIN — IBUPROFEN PRN MG: 400 TABLET ORAL at 14:12

## 2017-09-02 RX ADMIN — ASPIRIN 81 MG SCH MG: 81 TABLET ORAL at 09:35

## 2017-09-02 RX ADMIN — Medication SCH ML: at 20:16

## 2017-09-02 NOTE — HHI.PR
Subjective


Remarks


The patient said he was trying to shave.  He said he was still withdrawing and 

needed something to help with that.  He said that he has had seizures from 

alcohol withdrawal in the past.  He said he was at [facility] for several 

days in the past.  Discussed with nursing.





Objective


Vitals





Vital Signs








  Date Time  Temp Pulse Resp B/P (MAP) Pulse Ox O2 Delivery O2 Flow Rate FiO2


 


9/2/17 09:39     97   


 


9/2/17 08:00 97.8 69 20 120/82 (95) 94   


 


9/2/17 04:00 97.9 71 18 134/86 (102) 95   


 


9/2/17 00:00 98.4 67 20 131/85 (100) 95   


 


9/1/17 22:50 98.6 71 20 123/82 (96) 95   


 


9/1/17 20:49 98.4 74 18 129/87 (101) 98   


 


9/1/17 16:25  77      


 


9/1/17 16:04 98.3 76 16 132/83 (99) 97   


 


9/1/17 13:15 97.3 74 18 129/85 (100) 97   


 


9/1/17 13:03  77      














I/O      


 


 9/1/17 9/1/17 9/1/17 9/2/17 9/2/17 9/2/17





 07:00 15:00 23:00 07:00 15:00 23:00


 


Intake Total    240 ml  


 


Balance    240 ml  


 


      


 


Intake Oral    240 ml  


 


# Voids 1  3 1  








Result Diagram:  


8/31/17 0747                                                                   

             8/31/17 0747





Imaging





Last Impressions








Chest X-Ray 8/30/17 0000 Signed





Impressions: 





 Service Date/Time:  Wednesday, August 30, 2017 03:21 - CONCLUSION:  No acute 





 cardiopulmonary abnormality is identified.     Izaiah Quiroz MD 


 


CT Angiography 8/30/17 0000 Signed





Impressions: 





 Service Date/Time:  Wednesday, August 30, 2017 19:53 - CONCLUSION:  No 

pulmonary 





 embolus.      Izaiah Graves MD 








Objective Remarks


GENERAL: This is a well-nourished, well-developed patient, tremulous.


HEENT: NC, AT. 


CARDIOVASCULAR: Normal rate and regular rhythm without murmurs, gallops, or 

rubs. 


RESPIRATORY: Good respiratory efforts. Breath sounds equal and clear to 

auscultation bilaterally.


GASTROINTESTINAL: Abdomen soft, non-tender, non-distended. Normoactive bowel 

sounds.


MUSCULOSKELETAL: Extremities without cyanosis, or edema.


NEURO:  Alert & Oriented x4 to person, place, time, situation.  Moves all ext 

x4. Tremulous. 


PSYCH: Slightly flattened affect.


Medications and IVs





Current Medications








 Medications


  (Trade)  Dose


 Ordered  Sig/Devon


 Route  Start Time


 Stop Time Status Last Admin


 


  (NS Flush)  2 ml  UNSCH  PRN


 IV FLUSH  8/30/17 02:30


     


 


 


  (NS Flush)  2 ml  BID


 IV FLUSH  8/30/17 09:00


    9/2/17 09:36


 


 


  (Narcan Inj)  0.4 mg  UNSCH  PRN


 IV  8/30/17 02:30


     


 


 


  (Vitamin B1)  100 mg  DAILY


 PO  8/30/17 09:00


    9/2/17 09:36


 


 


  (Folate)  1 mg  DAILY


 PO  8/30/17 09:00


    9/2/17 09:36


 


 


  (Aspirin Chew)  81 mg  DAILY


 CHEW  8/30/17 16:15


    9/2/17 09:35


 


 


  (Lovenox Inj)  40 mg  Q24H


 SQ  9/1/17 04:00


    9/2/17 06:16


 


 


  (Librium)  25 mg  TID


 PO  9/1/17 13:00


    9/2/17 09:36


 


 


  (Motrin)  800 mg  Q6H  PRN


 PO  9/1/17 16:00


     


 


 


  (Ativan Inj)  1 mg  Q15M  PRN


 IV PUSH  9/1/17 14:45


     


 











A/P


Assessment and Plan


Alcohol withdrawal


The pt endorses hallucinations and high anxiety. Still tremulous.


   - Discontinue Librium. Start PO Ativan for anxiety/ withdrawal. 


   - Thiamine, folate


   Seizure precautions


   Monitor on telemetry


   Case management consult for discharge plan


            - Patient counseled on the detrimental effect of alcohol on his 

health.  He is advised to seek alcohol rehabilitation on discharge.





Physical deconditioning: Related to chronic alcohol abuse as above.  Probable 

peripheral neuropathy related to that.


- Continue physical therapy.





Atypical chest pain:  


Reviewed: EKG showed NSR with no acute ST changes.  Troponin 0.13, 0.11, 0.09, 

0.07; possibly secondary to demand from tachycardia.  Lipid profile within 

normal limits with favorable HDL.  Pulmonary angiogram showed no PE. EF mildly 

reduced at 45-50% on echo.


   Cardiology following. Continue Aspirin.  Pain is reproducible on exam.  

Doubt coronary syndrome. DC Morphine. Keep Ibuprofen PRN. 





DVT prophylaxis: Lovenox


Discharge Planning


Continue rehabilitation efforts for physical deconditioning.  Wean off 

benzodiazepines.  The patient should be cleared for discharge once PT 

determines he can ambulate safely at home.  Ideally he should go to [facility] for further alcohol dependence treatment. He is open to that.











Luke Ayoub DO Sep 2, 2017 12:04

## 2017-09-03 VITALS
TEMPERATURE: 97.6 F | HEART RATE: 74 BPM | DIASTOLIC BLOOD PRESSURE: 74 MMHG | OXYGEN SATURATION: 94 % | SYSTOLIC BLOOD PRESSURE: 124 MMHG | RESPIRATION RATE: 19 BRPM

## 2017-09-03 VITALS
OXYGEN SATURATION: 97 % | TEMPERATURE: 98.3 F | HEART RATE: 85 BPM | DIASTOLIC BLOOD PRESSURE: 80 MMHG | RESPIRATION RATE: 20 BRPM | SYSTOLIC BLOOD PRESSURE: 145 MMHG

## 2017-09-03 VITALS
OXYGEN SATURATION: 94 % | TEMPERATURE: 98.4 F | DIASTOLIC BLOOD PRESSURE: 56 MMHG | RESPIRATION RATE: 19 BRPM | HEART RATE: 76 BPM | SYSTOLIC BLOOD PRESSURE: 117 MMHG

## 2017-09-03 VITALS
RESPIRATION RATE: 20 BRPM | TEMPERATURE: 97.7 F | DIASTOLIC BLOOD PRESSURE: 65 MMHG | OXYGEN SATURATION: 95 % | HEART RATE: 67 BPM | SYSTOLIC BLOOD PRESSURE: 141 MMHG

## 2017-09-03 VITALS
RESPIRATION RATE: 16 BRPM | TEMPERATURE: 98.2 F | HEART RATE: 107 BPM | SYSTOLIC BLOOD PRESSURE: 136 MMHG | OXYGEN SATURATION: 95 % | DIASTOLIC BLOOD PRESSURE: 75 MMHG

## 2017-09-03 VITALS — OXYGEN SATURATION: 98 %

## 2017-09-03 VITALS — HEART RATE: 71 BPM

## 2017-09-03 VITALS
TEMPERATURE: 98.4 F | DIASTOLIC BLOOD PRESSURE: 63 MMHG | HEART RATE: 92 BPM | SYSTOLIC BLOOD PRESSURE: 128 MMHG | RESPIRATION RATE: 20 BRPM | OXYGEN SATURATION: 96 %

## 2017-09-03 VITALS — OXYGEN SATURATION: 93 %

## 2017-09-03 LAB
ANION GAP SERPL CALC-SCNC: 7 MEQ/L (ref 5–15)
BUN SERPL-MCNC: 11 MG/DL (ref 7–18)
CHLORIDE SERPL-SCNC: 106 MEQ/L (ref 98–107)
GFR SERPLBLD BASED ON 1.73 SQ M-ARVRAT: 100 ML/MIN (ref 89–?)
HCO3 BLD-SCNC: 25.5 MEQ/L (ref 21–32)
MAGNESIUM SERPL-MCNC: 2.2 MG/DL (ref 1.5–2.5)
POTASSIUM SERPL-SCNC: 4 MEQ/L (ref 3.5–5.1)
SODIUM SERPL-SCNC: 138 MEQ/L (ref 136–145)

## 2017-09-03 RX ADMIN — ASPIRIN 81 MG SCH MG: 81 TABLET ORAL at 08:21

## 2017-09-03 RX ADMIN — Medication SCH ML: at 08:22

## 2017-09-03 RX ADMIN — FOLIC ACID SCH MG: 1 TABLET ORAL at 08:21

## 2017-09-03 RX ADMIN — IBUPROFEN PRN MG: 400 TABLET ORAL at 14:32

## 2017-09-03 RX ADMIN — Medication SCH ML: at 20:37

## 2017-09-03 RX ADMIN — Medication SCH MG: at 08:21

## 2017-09-03 RX ADMIN — ENOXAPARIN SODIUM SCH MG: 40 INJECTION SUBCUTANEOUS at 05:13

## 2017-09-03 RX ADMIN — ENOXAPARIN SODIUM SCH MG: 40 INJECTION SUBCUTANEOUS at 04:00

## 2017-09-03 NOTE — PD.PSY.CON
Provisional Diagnosis


Admission Date


Date of consultation 9/3/2017


Axis I.


1.  PTSD, chronic


2.  Adjustment disorder with depression and anxiety


   Rule out primary depressive and primary anxiety disorder.


3.  Alcohol dependence


Axis II.


Deferred





History of Present Illness


Service


Psychiatry


Consult Requested By


Dr. Ayoub


Reason for Consult


PTSD, anxiety


Primary Care Physician


No Primary Care Physician


HPI


Mr. Ibrahim is a 52-year-old  male with no reported past psychiatric 

history who presented to the ED in an intoxicated state.  He was admitted to 

the medical floor for management of impending DTs.  Reviewing the electronic 

medical record, I see no prior psychiatric contact within our system.





Patient seen and examined.  Chart reviewed.  Case discussed with RN.  Nurse 

reports that the patient has been exaggerating symptoms on the CIWA scale, 

perhaps in service of obtaining benzodiazepines.  At one point he scored a 28 

based on reported symptoms but did not appear to be in significant withdrawal 

at all per RN.  On my exam today, patient presents as fairly anxious.  He 

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  He endorses associated 

avoidance and hyperarousal.  He reports that he has been drinking more to try 

to manage these symptoms.  Sleep is poor.  Mood is depressed.  Somewhat 

anhedonic.  Denies suicidal ideation.  No hypomanic/manic symptoms.  Denies AVH 

except as a component of the flashbacks.  No delusions.  Remainder of the 

psychiatric ROS is negative.  He is asking about voluntary psychiatric 

hospitalization because he says he would otherwise be homeless on discharge.  

He says that he has gone to [facility] for hospitalization but has been 

told they were full.





Past psychiatric history: Patient initially denies a history of psychiatric 

diagnosis or treatment but then says that he has been on psychotropics in the 

past without much benefit.  He says that the medication that has helped the 

most is Seroquel at a dose of 250 mg at bedtime.  He says that he got this from 

[place].  Denies a history of psychiatric 

admissions or suicide attempts.





Family history: The patient reports that his brother completed suicide in the 

context of a substance use disorder.  No other family psychiatric history.





Chemical dependency history: The patient reports that he drinks a fifth a day 

of liquor.  His longest sober time is 5 years.  He has participated in 12-step 

programming in the past.  He endorses a history of DTs and seizures.  Denies 

any other substance use.





Social history: The patient reports that he had been residing at a longterm 

house.  He does not wish to return there and would otherwise be homeless.  He 

is single with 2 children.  He used to work as an EMT.  He has a degree in 

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  He denies 

any  or legal history.  Denies any access to guns or firearms.





Review of Systems


Except as stated in HPI:  all other systems reviewed are Neg





Past Family Social History


Coded Allergies:  


     No Known Allergies (Unverified , 8/29/17)


Past Medical History


See EMR


No Active Prescriptions or Reported Meds





Current Medications








 Medications


  (Trade)  Dose


 Ordered  Sig/Devon


 Route  Start Time


 Stop Time Status Last Admin


 


  (NS Flush)  2 ml  UNSCH  PRN


 IV FLUSH  8/30/17 02:30


     


 


 


  (NS Flush)  2 ml  BID


 IV FLUSH  8/30/17 09:00


    9/3/17 08:22


 


 


  (Narcan Inj)  0.4 mg  UNSCH  PRN


 IV  8/30/17 02:30


     


 


 


  (Vitamin B1)  100 mg  DAILY


 PO  8/30/17 09:00


    9/3/17 08:21


 


 


  (Folate)  1 mg  DAILY


 PO  8/30/17 09:00


    9/3/17 08:21


 


 


  (Aspirin Chew)  81 mg  DAILY


 CHEW  8/30/17 16:15


    9/3/17 08:21


 


 


  (Lovenox Inj)  40 mg  Q24H


 SQ  9/1/17 04:00


    9/3/17 05:13


 


 


  (Motrin)  800 mg  Q6H  PRN


 PO  9/1/17 16:00


    9/2/17 20:16


 


 


  (Ativan Inj)  1 mg  Q15M  PRN


 IV PUSH  9/1/17 14:45


     


 


 


  (Ativan)  1 mg  Q6H  PRN


 PO  9/2/17 12:15


    9/3/17 08:21


 








Patient's Strengths (min. 2)


Able to access clinical care.  Verbally fluent.





Physical Exam


Physical exam completed by primary team.  On my examination today, the patient 

appears to be in no acute physical distress.  Mildly tremulous but no 

diaphoresis, no mydriasis, no other signs of withdrawal noted.  No other motor 

abnormalities noted.  Labs and vitals reviewed:


Vital Signs





Vital Signs








  Date Time  Temp Pulse Resp B/P (MAP) Pulse Ox O2 Delivery O2 Flow Rate FiO2


 


9/3/17 12:00 98.3 85 20 145/80 (101) 97   


 


8/31/17 20:09        21














I/O   


 


 9/3/17 9/3/17 9/3/17





 07:59 15:59 23:59


 


Intake Total 540 ml  


 


Output Total 900 ml  


 


Balance -360 ml  








Lab Results











Test


  9/3/17


08:58


 


Blood Urea Nitrogen 11 MG/DL 


 


Creatinine 0.81 MG/DL 


 


Random Glucose 90 MG/DL 


 


Calcium Level 8.7 MG/DL 


 


Phosphorus Level 2.6 MG/DL 


 


Magnesium Level 2.2 MG/DL 


 


Sodium Level 138 MEQ/L 


 


Potassium Level 4.0 MEQ/L 


 


Chloride Level 106 MEQ/L 


 


Carbon Dioxide Level 25.5 MEQ/L 


 


Anion Gap 7 MEQ/L 


 


Estimat Glomerular Filtration


Rate 100 ML/MIN 


 





EKG performed on 8/30 revealed sinus rhythm with a QTC of 408 ms.





Mental Status Examination


Motor exam as above


Appearance


Casually dressed and groomed


Speech:  Other (stammer.  Otherwise unremarkable.)


Orientation:  Person, Place (at least)


Memory:  Unremarkable


Thought Process:  Logical, Goal Directed, Linear


Thought Content:  Unremarkable


Language


Unremarkable


Fund of Knowledge


Average


Hallucination Type:  None (except as part of reported reexperiencing)


Attention and Concentration:  Good


Suicidal Ideation:  No


Previous Suicide Attempts:  No


Homicidal Ideation:  No


Previous Homicide Attempts:  No


Insight:  Fair


Judgment:  Poor


Affect:  Other (blunted)


Mood:  Other (somewhat depressed)





Assessment & Plan


Problem List:  


(1) Chronic post-traumatic stress disorder (PTSD)


ICD Codes:  F43.12 - Post-traumatic stress disorder, chronic


(2) Adjustment disorder with mixed anxiety and depressed mood


ICD Codes:  F43.23 - Adjustment disorder with mixed anxiety and depressed mood


(3) Alcohol dependence


ICD Codes:  F10.20 - Alcohol dependence, uncomplicated


Assessment & Plan


52-year-old  male with psychiatric history as detailed above who is 

presently voluntarily admitted to the medical unit for management of alcohol 

withdrawal.  On my examination today, the patient describes symptoms of 

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 

also his brother's suicide.  Some comorbid depressive and anxious symptoms that 

are likely reactive in nature but may represent independent psychiatric 

diagnoses.  Denies SI.  Says he derived the most benefit in the past from 

Seroquel.  Asking about voluntary psychiatric hospitalization, but as he tells 

it this is at least in part because he would be otherwise homeless as he does 

not wish to return to the sober living he was at before.  For now I recommend 

the following:





--Start Seroquel 50mg qHS.  This could be increased tomorrow to 100mg qHS if 

initial dose is well tolerated and does not result in excessive sedation.  R/B/

A discussed with patient in detail.  I have discussed with patient that this is 

being used off label for his current symptoms/diagnoses.  I did discuss more 

traditional therapy (e.g. SSRI) but he says the Seroquel worked best.  QTc wnl.


--No indication for [facility] at this time.  He might benefit from voluntary 

psychiatric hospitalization once medically cleared.  If the patient wishes, he 

could and should be placed on the list for inpatient services at [facility] as a 

voluntary patient.


--If not transferred to inpatient psych, patient would most certainly benefit 

from outpatient psychiatric, psychotherapeutic and chemical dependency services

, and he should be referred for these on discharge.





Case discussed with RN.





Thank you very much for this consultation.  Please call or page with questions.


Request HC Surrog/Guard Advoc?:  No











Sony Burris MD Sep 3, 2017 13:26

## 2017-09-03 NOTE — HHI.PR
Subjective


Remarks


The patient says he feels much better on the Ativan.  He says he is still 

hallucinating.  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  He says 

those experiences trigger panic attacks and generalized anxiety.  He is open to 

speaking with the psychiatrist.  He has no other acute complaints.  Discussed 

with nursing.





Objective


Vitals





Vital Signs








  Date Time  Temp Pulse Resp B/P (MAP) Pulse Ox O2 Delivery O2 Flow Rate FiO2


 


9/3/17 08:04     93   


 


9/3/17 08:00 97.7 67 20 141/65 (90) 95   


 


9/3/17 04:00 97.6 74 19 124/74 (91) 94   


 


9/3/17 00:00 98.4 76 19 117/56 (76) 94   


 


9/2/17 20:00  79      


 


9/2/17 20:00 97.9 73 17 134/86 (102) 94   


 


9/2/17 19:46  71      


 


9/2/17 17:26   18     


 


9/2/17 16:00 98.4 92 19 128/88 (101) 94   


 


9/2/17 12:00 98.1 64 19 134/83 (100) 96   














I/O      


 


 9/2/17 9/2/17 9/2/17 9/3/17 9/3/17 9/3/17





 06:59 14:59 22:59 06:59 14:59 22:59


 


Intake Total 240 ml  380 ml 540 ml  


 


Output Total    900 ml  


 


Balance 240 ml  380 ml -360 ml  


 


      


 


Intake Oral 240 ml  380 ml 540 ml  


 


IV Total   0 ml   


 


Output Urine Total    900 ml  


 


# Voids 1  6   


 


# Bowel Movements   1 1  








Result Diagram:  


8/31/17 0747                                                                   

             8/31/17 0747





Imaging





Last Impressions








Chest X-Ray 8/30/17 0000 Signed





Impressions: 





 Service Date/Time:  Wednesday, August 30, 2017 03:21 - CONCLUSION:  No acute 





 cardiopulmonary abnormality is identified.     Izaiah Quiroz MD 


 


CT Angiography 8/30/17 0000 Signed





Impressions: 





 Service Date/Time:  Wednesday, August 30, 2017 19:53 - CONCLUSION:  No 

pulmonary 





 embolus.      Izaiah Graves MD 








Objective Remarks


GENERAL: This is a well-nourished, well-developed patient, in no apparent 

distress.


HEENT: NC, AT. 


CARDIOVASCULAR: Normal rate and regular rhythm without murmurs, gallops, or 

rubs. 


RESPIRATORY: Good respiratory efforts. Breath sounds equal and clear to 

auscultation bilaterally.


GASTROINTESTINAL: Abdomen soft, non-tender, non-distended. Normoactive bowel 

sounds.


MUSCULOSKELETAL: Extremities without cyanosis, or edema.


NEURO:  Alert & Oriented x4 to person, place, time, situation.  Moves all ext 

x4. 


PSYCH: Slightly flattened affect.


Medications and IVs





Current Medications








 Medications


  (Trade)  Dose


 Ordered  Sig/Devon


 Route  Start Time


 Stop Time Status Last Admin


 


  (NS Flush)  2 ml  UNSCH  PRN


 IV FLUSH  8/30/17 02:30


     


 


 


  (NS Flush)  2 ml  BID


 IV FLUSH  8/30/17 09:00


    9/3/17 08:22


 


 


  (Narcan Inj)  0.4 mg  UNSCH  PRN


 IV  8/30/17 02:30


     


 


 


  (Vitamin B1)  100 mg  DAILY


 PO  8/30/17 09:00


    9/3/17 08:21


 


 


  (Folate)  1 mg  DAILY


 PO  8/30/17 09:00


    9/3/17 08:21


 


 


  (Aspirin Chew)  81 mg  DAILY


 CHEW  8/30/17 16:15


    9/3/17 08:21


 


 


  (Lovenox Inj)  40 mg  Q24H


 SQ  9/1/17 04:00


    9/3/17 05:13


 


 


  (Motrin)  800 mg  Q6H  PRN


 PO  9/1/17 16:00


    9/2/17 20:16


 


 


  (Ativan Inj)  1 mg  Q15M  PRN


 IV PUSH  9/1/17 14:45


     


 


 


  (Ativan)  1 mg  Q6H  PRN


 PO  9/2/17 12:15


    9/3/17 08:21


 











A/P


Assessment and Plan


Alcohol withdrawal


The pt endorses hallucinations and high anxiety.  No longer tremulous. S/p EtOH 

counselor consult. 


- Discontinued Librium and started PO Ativan. Improved. 


- Thiamine, folate.


- Seizure precautions.


- Monitor on telemetry.


- Case management consult for discharge plan.


- Patient counseled on the detrimental effect of alcohol on his health.  He is 

advised to seek alcohol rehabilitation on discharge.





Physical deconditioning


Related to chronic alcohol abuse as above. 


- Continue physical therapy.





Atypical chest pain


Reviewed: EKG showed NSR with no acute ST changes.  Troponin 0.13, 0.11, 0.09, 

0.07; possibly secondary to demand from tachycardia.  Lipid profile within 

normal limits with favorable HDL.  Pulmonary angiogram showed no PE. EF mildly 

reduced at 45-50% on echo. Cardiology following. Pain is reproducible on exam. 


- continue ASA.


- follow up with cardiology.





PTSD


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 

flashbacks.


- continue Ativan as needed.


- psych consult requested.





DVT prophylaxis: Lovenox


Discharge Planning


D/c home after psych eval and when cleared by cardiology. Anticipate 1-2 days. 

Ideally he should go to [facility] for further alcohol dependence 

treatment. He is open to that.











Luke Ayoub DO Sep 3, 2017 09:56

## 2017-09-04 VITALS
OXYGEN SATURATION: 97 % | SYSTOLIC BLOOD PRESSURE: 111 MMHG | RESPIRATION RATE: 18 BRPM | HEART RATE: 73 BPM | DIASTOLIC BLOOD PRESSURE: 64 MMHG | TEMPERATURE: 98 F

## 2017-09-04 VITALS
TEMPERATURE: 97.5 F | OXYGEN SATURATION: 97 % | RESPIRATION RATE: 16 BRPM | SYSTOLIC BLOOD PRESSURE: 120 MMHG | HEART RATE: 70 BPM | DIASTOLIC BLOOD PRESSURE: 92 MMHG

## 2017-09-04 VITALS
DIASTOLIC BLOOD PRESSURE: 66 MMHG | HEART RATE: 74 BPM | RESPIRATION RATE: 18 BRPM | OXYGEN SATURATION: 96 % | SYSTOLIC BLOOD PRESSURE: 111 MMHG | TEMPERATURE: 98.7 F

## 2017-09-04 VITALS
DIASTOLIC BLOOD PRESSURE: 100 MMHG | RESPIRATION RATE: 18 BRPM | TEMPERATURE: 98.3 F | SYSTOLIC BLOOD PRESSURE: 156 MMHG | OXYGEN SATURATION: 97 % | HEART RATE: 98 BPM

## 2017-09-04 RX ADMIN — ASPIRIN 81 MG SCH MG: 81 TABLET ORAL at 08:35

## 2017-09-04 RX ADMIN — Medication SCH MG: at 08:35

## 2017-09-04 RX ADMIN — FOLIC ACID SCH MG: 1 TABLET ORAL at 08:35

## 2017-09-04 RX ADMIN — Medication SCH ML: at 08:36

## 2017-09-04 RX ADMIN — ENOXAPARIN SODIUM SCH MG: 40 INJECTION SUBCUTANEOUS at 04:21

## 2017-09-04 NOTE — HHI.DCPOC
Discharge Care Plan


Diagnosis:  


(1) Chronic post-traumatic stress disorder (PTSD)


(2) Adjustment disorder with mixed anxiety and depressed mood


(3) Alcohol dependence


(4) Atypical chest pain


(5) Anxiety


(6) Alcohol intoxication


Goals to Promote Your Health


* To prevent worsening of your condition and complications


* To maintain your health at the optimal level


Directions to Meet Your Goals


*** Take your medications as prescribed


*** Follow your dietary instruction


*** Follow activity as directed








*** Keep your appointments as scheduled


*** Take your immunizations and boosters as scheduled


*** If your symptoms worsen call your PCP, if no PCP go to Urgent Care Center 

or Emergency Room***


*** Smoking is Dangerous to Your Health. Avoid second hand smoke***


***Call the 24-hour hour crisis hotline for domestic abuse at 1-799.790.4524***











Luke Ayoub DO Sep 4, 2017 13:47

## 2017-09-04 NOTE — HHI.DS
__________________________________________________





Discharge Summary


Admission Date


Aug 30, 2017 at 11:29


Discharge Date:  Sep 4, 2017


Admitting Diagnosis





Alcohol withdrawal





(1) Chronic post-traumatic stress disorder (PTSD)


ICD Code:  F43.12 - Post-traumatic stress disorder, chronic


(2) Adjustment disorder with mixed anxiety and depressed mood


ICD Code:  F43.23 - Adjustment disorder with mixed anxiety and depressed mood


(3) Alcohol dependence


ICD Code:  F10.20 - Alcohol dependence, uncomplicated


Diagnosis:  Principal





(4) Atypical chest pain


ICD Code:  R07.89 - Other chest pain


Status:  Acute


(5) Anxiety


ICD Code:  F41.9 - Anxiety disorder, unspecified


Status:  Acute


(6) Alcohol intoxication


ICD Code:  F10.929 - Alcohol use, unspecified with intoxication, unspecified


Diagnosis:  Principal


Status:  Acute


Procedures


None


Brief History - From Admission


said he called the ambulance because he is experiencing dts 


shaking, sweating, tremors


today last drink.  Drinks 1 bottle of [place] a day and had it today


get all flushed, hallucinating 


no nausea vomiting diarrhea


no fever 


coughing but this is his normal at baseline 


no burning on urination or pain 


pain on left chest always there


no blood in urine or stool


CBC/BMP:  


8/31/17 0747                                                                   

             9/3/17 0858





Significant Findings





Laboratory Tests








Test


  9/3/17


08:58








Imaging





Last Impressions








Chest X-Ray 8/30/17 0000 Signed





Impressions: 





 Service Date/Time:  Wednesday, August 30, 2017 03:21 - CONCLUSION:  No acute 





 cardiopulmonary abnormality is identified.     Izaiah Quiroz MD 


 


CT Angiography 8/30/17 0000 Signed





Impressions: 





 Service Date/Time:  Wednesday, August 30, 2017 19:53 - CONCLUSION:  No 

pulmonary 





 embolus.      Izaiah Graves MD 








PE at Discharge


GENERAL: This is a well-nourished, well-developed patient, in no apparent 

distress.


HEENT: NC, AT. 


CARDIOVASCULAR: Normal rate and regular rhythm without murmurs, gallops, or 

rubs. 


RESPIRATORY: Good respiratory efforts. Breath sounds equal and clear to 

auscultation bilaterally.


GASTROINTESTINAL: Abdomen soft, non-tender, non-distended. Normoactive bowel 

sounds.


MUSCULOSKELETAL: Extremities without cyanosis, or edema.


NEURO:  Alert & Oriented x4 to person, place, time, situation.  Moves all ext 

x4. 


PSYCH: Flat affect.


Hospital Course


Alcohol withdrawal


The pt endorsed hallucinations and high anxiety. Initially tremulous. S/p EtOH 

counselor consult. He says his withdrawal usually lasts 10-14 days. We 

discontinued Librium and started PO Ativan as needed. He received Thiamine and 

folate. He was placed on seizure precautions. He was monitored on telemetry. 

Case management was consulted for discharge plan. Patient was counseled on the 

detrimental effect of alcohol on his health.  He is advised to seek alcohol 

rehabilitation on discharge. He said he will try to enroll in a treatment 

center. He will be discharged with an Ativan taper.





Physical deconditioning


Related to chronic alcohol abuse as above. He worked with physical therapy.





Atypical chest pain


Reviewed: EKG showed NSR with no acute ST changes.  Troponin 0.13, 0.11, 0.09, 

0.07. Lipid profile within normal limits with favorable HDL.  Pulmonary 

angiogram showed no PE. EF mildly reduced at 45-50% on echo. Cardiology was 

consulted. Pain was reproducible on exam.  He was continued on ASA. He will 

follow up with cardiology as an outpt. He did not wish to remain in the 

hospital in case cardiology wanted to perform a stress test on him now that his 

withdrawal was better. He said he will follow up with cardiology in the clinic. 

He will continue ASA. He did not complain of any further chest pain.





PTSD


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 

flashbacks. Psych was consulted. He received Ativan as needed.  Seroquel 

started by psychiatry. He will need to follow up at a treatment center.


Pt Condition on Discharge:  Stable


Discharge Disposition:  Discharge Home


Discharge Time:  > 30 minutes


Discharge Instructions


DIET: Follow Instructions for:  Heart Healthy Diet


Activities you can perform:  Weight Bearing as Leonardo


Follow up Referrals:  


Cardiology - 1 Week with Dr. Hunter


PCP Follow-up - 1 Week


Psychiatry Adult - 1 Week





New Medications:  


Misc. Devices (Roller Walker) 1 Mis Mis


EA .ROUTE NOW, #1





Aspirin (Aspirin Low Strength) 81 Mg Chew


81 MG CHEW DAILY for Heart, #30 EA





Lorazepam (Ativan) 1 Mg Tab


1 MG PO Q6H PRN for Anxiety/ Withdrawal, #12 TAB





Quetiapine (Quetiapine) 25 Mg Tab


50 MG PO HS for PTSD, #30 TAB

















Luke Ayoub DO Sep 4, 2017 13:55

## 2017-09-04 NOTE — HHI.PR
Subjective


Remarks


The patient wanted more Ativan.  He said he was still withdrawing.  He says he 

usually withdraws over 10-14 day period.  He was ambulating with a walker.  She 

wanted increased doses of Seroquel.  He said he will go to [place] and will 

get a ride with a friend who happens to be affiliated with [employer]. 

Discussed with nursing.





Objective


Vitals





Vital Signs








  Date Time  Temp Pulse Resp B/P (MAP) Pulse Ox O2 Delivery O2 Flow Rate FiO2


 


9/4/17 11:35 98.3 98 18 156/100 (118) 97   


 


9/4/17 07:58 97.5 70 16 120/92 (101) 97   


 


9/4/17 04:00 98.0 73 18 111/64 (80) 97   


 


9/4/17 04:00      Room Air  


 


9/4/17 00:00      Room Air  


 


9/4/17 00:00 98.7 74 18 111/66 (81) 96   


 


9/3/17 20:00 98.2 107 16 136/75 (95) 95   


 


9/3/17 20:00      Room Air  


 


9/3/17 20:00  84      


 


9/3/17 18:00     98   21


 


9/3/17 17:14   18     


 


9/3/17 16:37  71      


 


9/3/17 16:00 98.4 92 20 128/63 (84) 96   














I/O      


 


 9/3/17 9/3/17 9/3/17 9/4/17 9/4/17 9/4/17





 07:00 15:00 23:00 07:00 15:00 23:00


 


Intake Total 540 ml 480 ml  620 ml  


 


Output Total 900 ml   900 ml  


 


Balance -360 ml 480 ml  -280 ml  


 


      


 


Intake Oral 540 ml 480 ml  620 ml  


 


Output Urine Total 900 ml   900 ml  


 


# Voids  6    


 


# Bowel Movements 1 1  0  








Result Diagram:  


8/31/17 0747                                                                   

             9/3/17 0858





Imaging





Last Impressions








Chest X-Ray 8/30/17 0000 Signed





Impressions: 





 Service Date/Time:  Wednesday, August 30, 2017 03:21 - CONCLUSION:  No acute 





 cardiopulmonary abnormality is identified.     Izaiah Quiroz MD 


 


CT Angiography 8/30/17 0000 Signed





Impressions: 





 Service Date/Time:  Wednesday, August 30, 2017 19:53 - CONCLUSION:  No 

pulmonary 





 embolus.      Izaiah Graves MD 








Objective Remarks


GENERAL: This is a well-nourished, well-developed patient, in no apparent 

distress.


HEENT: NC, AT. 


CARDIOVASCULAR: Normal rate and regular rhythm without murmurs, gallops, or 

rubs. 


RESPIRATORY: Good respiratory efforts. Breath sounds equal and clear to 

auscultation bilaterally.


GASTROINTESTINAL: Abdomen soft, non-tender, non-distended. Normoactive bowel 

sounds.


MUSCULOSKELETAL: Extremities without cyanosis, or edema.


NEURO:  Alert & Oriented x4 to person, place, time, situation.  Moves all ext 

x4. 


PSYCH: Flat affect.


Medications and IVs





Current Medications








 Medications


  (Trade)  Dose


 Ordered  Sig/Devon


 Route  Start Time


 Stop Time Status Last Admin


 


  (NS Flush)  2 ml  UNSCH  PRN


 IV FLUSH  8/30/17 02:30


     


 


 


  (NS Flush)  2 ml  BID


 IV FLUSH  8/30/17 09:00


    9/4/17 08:36


 


 


  (Narcan Inj)  0.4 mg  UNSCH  PRN


 IV  8/30/17 02:30


     


 


 


  (Vitamin B1)  100 mg  DAILY


 PO  8/30/17 09:00


    9/4/17 08:35


 


 


  (Folate)  1 mg  DAILY


 PO  8/30/17 09:00


    9/4/17 08:35


 


 


  (Aspirin Chew)  81 mg  DAILY


 CHEW  8/30/17 16:15


    9/4/17 08:35


 


 


  (Lovenox Inj)  40 mg  Q24H


 SQ  9/1/17 04:00


    9/4/17 04:21


 


 


  (Motrin)  800 mg  Q6H  PRN


 PO  9/1/17 16:00


    9/3/17 14:32


 


 


  (Ativan Inj)  1 mg  Q15M  PRN


 IV PUSH  9/1/17 14:45


     


 


 


  (Ativan)  1 mg  Q6H  PRN


 PO  9/2/17 12:15


    9/4/17 10:43


 


 


  (SEROquel)  50 mg  HS


 PO  9/3/17 21:00


    9/3/17 20:37


 











A/P


Assessment and Plan


Alcohol withdrawal


The pt endorses hallucinations and high anxiety.  No longer tremulous. S/p EtOH 

counselor consult. He says his withdrawal usually lasts 10-14 days.


- Discontinued Librium and started PO Ativan as needed. Improved. 


- Thiamine, folate.


- Seizure precautions.


- Monitor on telemetry.


- Case management consult for discharge plan.


- Patient counseled on the detrimental effect of alcohol on his health.  He is 

advised to seek alcohol rehabilitation on discharge.





Physical deconditioning


Related to chronic alcohol abuse as above. 


- Continue physical therapy.





Atypical chest pain


Reviewed: EKG showed NSR with no acute ST changes.  Troponin 0.13, 0.11, 0.09, 

0.07; possibly secondary to demand from tachycardia.  Lipid profile within 

normal limits with favorable HDL.  Pulmonary angiogram showed no PE. EF mildly 

reduced at 45-50% on echo. Cardiology following. Pain is reproducible on exam. 


- continue ASA.


- follow up with cardiology.





PTSD


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 

flashbacks. Psych consult appreciated.


- continue Ativan as needed.


- Seroquel started. Increase to 100 mg HS.





DVT prophylaxis: Lovenox


Discharge Planning


D/c home if cleared by cardiology in AM. Ideally he should go to [facility] for further alcohol dependence treatment. Anticipate d/c 9/4.











Luke Ayoub DO Sep 4, 2017 13:28

## 2017-12-21 ENCOUNTER — HOSPITAL ENCOUNTER (EMERGENCY)
Dept: HOSPITAL 17 - PHED | Age: 52
Discharge: HOME | End: 2017-12-21
Payer: COMMERCIAL

## 2017-12-21 VITALS
DIASTOLIC BLOOD PRESSURE: 82 MMHG | SYSTOLIC BLOOD PRESSURE: 138 MMHG | HEART RATE: 70 BPM | OXYGEN SATURATION: 98 % | TEMPERATURE: 98.1 F | RESPIRATION RATE: 20 BRPM

## 2017-12-21 VITALS — SYSTOLIC BLOOD PRESSURE: 128 MMHG | DIASTOLIC BLOOD PRESSURE: 85 MMHG
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
## 2017-12-21 DIAGNOSIS — S16.1XXA: Primary | ICD-10-CM

## 2017-12-21 PROCEDURE — 72040 X-RAY EXAM NECK SPINE 2-3 VW: CPT

## 2017-12-21 PROCEDURE — 99283 EMERGENCY DEPT VISIT LOW MDM: CPT

## 2017-12-21 PROCEDURE — 73030 X-RAY EXAM OF SHOULDER: CPT
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
## 2018-06-08 ENCOUNTER — HOSPITAL ENCOUNTER (EMERGENCY)
Dept: HOSPITAL 17 - NEPD | Age: 53
Discharge: HOME | End: 2018-06-08
Payer: SELF-PAY

## 2018-06-08 VITALS
SYSTOLIC BLOOD PRESSURE: 160 MMHG | DIASTOLIC BLOOD PRESSURE: 96 MMHG | HEART RATE: 79 BPM | TEMPERATURE: 98.5 F | OXYGEN SATURATION: 99 % | RESPIRATION RATE: 16 BRPM

## 2018-06-08 VITALS — BODY MASS INDEX: 23.15 KG/M2 | WEIGHT: 165.35 LBS | HEIGHT: 71 IN
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
## 2018-06-08 DIAGNOSIS — S71.112A: Primary | ICD-10-CM

## 2018-06-08 PROCEDURE — 12002 RPR S/N/AX/GEN/TRNK2.6-7.5CM: CPT

## 2018-06-08 NOTE — PD
HPI


Chief Complaint:  Skin Problem


Time Seen by Provider:  15:45


Travel History


International Travel<30 days:  No


Contact w/Intl Traveler<30days:  No


Traveled to known affect area:  No





History of Present Illness


HPI


53-year-old male complains of laceration to left leg.  Patient accidentally 

lacerated his left leg this afternoon.  Patient is up-to-date with TD booster.  

Patient denies any weakness and numbness of the extremity.





PFSH


Past Medical History


Arthritis:  Yes


Asthma:  No


Blood Disorders:  No


Anxiety:  Yes


Depression:  No


Heart Rhythm Problems:  No


Cancer:  No


Cardiovascular Problems:  No


High Cholesterol:  No


Chemotherapy:  No


Chest Pain:  No


Congestive Heart Failure:  No


COPD:  Yes


Diabetes:  No


Diminished Hearing:  No


Endocrine:  No


Gastrointestinal Disorders:  Yes


GERD:  Yes


Genitourinary:  No


Hiatal Hernia:  Yes


Immune Disorder:  No


Inguinal Hernia:  Yes


Musculoskeletal:  Yes (BACK)


Neurologic:  No


Psychiatric:  Yes


Reproductive:  No


Respiratory:  Yes (PE)


Radiation Therapy:  No


Sleep Apnea:  No


Thyroid Disease:  No





Past Surgical History


Joint Replacement:  Yes (Bi KNEE SURGERY )


Other Surgery:  Yes





Social History


Alcohol Use:  Yes (ETOH ABUSE)


Tobacco Use:  Yes (2-3 cigarettes/daily)


Substance Use:  No





Allergies-Medications


(Allergen,Severity, Reaction):  


Coded Allergies:  


     No Known Allergies (Unverified , 6/8/18)


Reported Meds & Prescriptions





Reported Meds & Active Scripts


Active


Orphenadrine CR (Orphenadrine Citrate) 100 Mg Tab 100 Mg PO Q12HR


Chlordiazepoxide HCl 25 Mg Capsule 1 Tab PO AS DIRECTED








Review of Systems


General / Constitutional:  No: Fever


Eyes:  No: Visual changes


HENT:  No: Headaches


Cardiovascular:  No: Chest Pain or Discomfort


Respiratory:  No: Shortness of Breath


Gastrointestinal:  No: Abdominal Pain


Genitourinary:  No: Dysuria


Musculoskeletal:  No: Pain


Skin:  No Rash


Neurologic:  No: Weakness


Psychiatric:  No: Depression


Endocrine:  No: Polydipsia


Hematologic/Lymphatic:  No: Easy Bruising





Physical Exam


Narrative


GENERAL: Well-nourished, well-developed patient.


SKIN: Focused skin assessment warm/dry.


HEAD: Normocephalic.


EYES: No scleral icterus. No injection or drainage. 


NECK: Supple, trachea midline. No JVD or lymphadenopathy.


CARDIOVASCULAR: Regular rate and rhythm without murmurs, gallops, or rubs. 


RESPIRATORY: Breath sounds equal bilaterally. No accessory muscle use.


GASTROINTESTINAL: Abdomen soft, non-tender, nondistended. 


MUSCULOSKELETAL: No cyanosis, or edema. 


BACK: Nontender without obvious deformity. No CVA tenderness.


Patient has 3.5 cm laceration anterior distal aspect of the left thigh.  

Sensory motor function distally intact.  No active bleeding.





Data


Data


Last Documented VS





Vital Signs








  Date Time  Temp Pulse Resp B/P (MAP) Pulse Ox O2 Delivery O2 Flow Rate FiO2


 


6/8/18 14:00 98.5 79 16 160/96 (117) 99   








Orders





 Orders


Lidocai-Epi 2%-1:100,000 Inj (Xylocaine- (6/8/18 16:00)








MDM


Medical Decision Making


Medical Screen Exam Complete:  Yes


Emergency Medical Condition:  Yes


Differential Diagnosis


Differential diagnosis including laceration.  Ligament tendon joint injury.


Narrative Course


53-year-old male with right leg laceration.





Diagnosis





 Primary Impression:  


 Laceration of right thigh


 Qualified Codes:  S71.111A - Laceration without foreign body, right thigh, 

initial encounter


Patient Instructions:  General Instructions





***Additional Instructions:  


Wound care daily.  Follow-up with personal physician or return in 14 days for 

suture removal.


***Med/Other Pt SpecificInfo:  No Meds Exist/No RX given


Disposition:  01 DISCHARGE HOME


Condition:  Stable











Eh Self MD Jun 8, 2018 15:52

## 2018-06-08 NOTE — PD
Physical Exam


Date Seen by Provider:  Jun 8, 2018


Time Seen by Provider:  16:26


Narrative


I was asked by Dr. Self to see this 53-year-old male with a laceration to the 

left lateral distal thigh.  Please see my procedure note.





Data


Data


Last Documented VS





Vital Signs








  Date Time  Temp Pulse Resp B/P (MAP) Pulse Ox O2 Delivery O2 Flow Rate FiO2


 


6/8/18 14:00 98.5 79 16 160/96 (117) 99   








Orders





 Orders


Lidocai-Epi 2%-1:100,000 Inj (Xylocaine- (6/8/18 16:00)








MDM


Medical Record Reviewed:  Yes


Supervised Visit with FUAD:  Yes


Procedures


**Procedure Narrative**


LACERATION


LOCATION: Left lateral distal thigh


LENGTH: 4 cm


NUMBER OF STITCHES/STAPLES: 5 interrupted horizontal mattress





REPAIR: The area of the laceration was prepped with Betadine and sterilely 

draped.  The laceration was infiltrated with 4 mL's 1% lidocaine with epi.  The 

wound was copiously irrigated and explored without evidence of foreign body, 

tendon injury or neurovascular injury.  The wound was closed using 5-0 Ethilon. 

This was a single layer repair. A sterile dressing was applied. The patient was 

advised to keep the dressing clean and dry. Patient tolerated the procedure 

well.





Diagnosis





 Primary Impression:  


 Laceration of right thigh


 Qualified Codes:  S71.111A - Laceration without foreign body, right thigh, 

initial encounter


Patient Instructions:  Care For Your Stitches (ED), General Instructions





***Additional Instruction:  


Wound care daily.  Follow-up with personal physician or return in 14 days for 

suture removal.


***Med/Other Pt SpecificInfo:  Wound Care


Scripts


No Active Prescriptions or Reported Meds


Disposition:  01 DISCHARGE HOME


Condition:  Stable











Luther Lofton Jun 8, 2018 16:28